# Patient Record
Sex: MALE | Race: BLACK OR AFRICAN AMERICAN | NOT HISPANIC OR LATINO | Employment: OTHER | URBAN - METROPOLITAN AREA
[De-identification: names, ages, dates, MRNs, and addresses within clinical notes are randomized per-mention and may not be internally consistent; named-entity substitution may affect disease eponyms.]

---

## 2017-01-26 ENCOUNTER — TELEPHONE (OUTPATIENT)
Dept: PAIN MEDICINE | Facility: CLINIC | Age: 82
End: 2017-01-26

## 2017-01-26 NOTE — TELEPHONE ENCOUNTER
----- Message from Daya Salvador sent at 1/26/2017  2:26 PM CST -----  Dr. Hudson, advise what procedure needed for pt.    ----- Message -----     From: aMrcia Chavez     Sent: 1/26/2017   8:15 AM       To: [facility] Pain Management Schedulers    x  1st Request  _  2nd Request  _  3rd Request        Who: leonard    Why: pt. Would like to schedule injection.please call pt. To discuss    What Number to Call Back:[phone]    When to Expect a call back: (Before the end of the day)   -- if call after 3:00 call back will be tomorrow.

## 2017-01-27 ENCOUNTER — OFFICE VISIT (OUTPATIENT)
Dept: PAIN MEDICINE | Facility: CLINIC | Age: 82
End: 2017-01-27
Payer: MEDICARE

## 2017-01-27 VITALS
HEIGHT: 70 IN | WEIGHT: 224.88 LBS | HEART RATE: 82 BPM | TEMPERATURE: 98 F | BODY MASS INDEX: 32.19 KG/M2 | SYSTOLIC BLOOD PRESSURE: 168 MMHG | DIASTOLIC BLOOD PRESSURE: 72 MMHG

## 2017-01-27 DIAGNOSIS — M47.816 FACET ARTHRITIS OF LUMBAR REGION: ICD-10-CM

## 2017-01-27 DIAGNOSIS — M51.36 DDD (DEGENERATIVE DISC DISEASE), LUMBAR: ICD-10-CM

## 2017-01-27 DIAGNOSIS — M51.16 LUMBAR DISC HERNIATION WITH RADICULOPATHY: Primary | ICD-10-CM

## 2017-01-27 PROCEDURE — 1159F MED LIST DOCD IN RCRD: CPT | Mod: S$GLB,,, | Performed by: NURSE PRACTITIONER

## 2017-01-27 PROCEDURE — 1160F RVW MEDS BY RX/DR IN RCRD: CPT | Mod: S$GLB,,, | Performed by: NURSE PRACTITIONER

## 2017-01-27 PROCEDURE — 99999 PR PBB SHADOW E&M-EST. PATIENT-LVL III: CPT | Mod: PBBFAC,,, | Performed by: NURSE PRACTITIONER

## 2017-01-27 PROCEDURE — 99213 OFFICE O/P EST LOW 20 MIN: CPT | Mod: S$GLB,,, | Performed by: NURSE PRACTITIONER

## 2017-01-27 PROCEDURE — 1125F AMNT PAIN NOTED PAIN PRSNT: CPT | Mod: S$GLB,,, | Performed by: NURSE PRACTITIONER

## 2017-01-27 PROCEDURE — 1157F ADVNC CARE PLAN IN RCRD: CPT | Mod: S$GLB,,, | Performed by: NURSE PRACTITIONER

## 2017-01-27 NOTE — PROGRESS NOTES
Chronic Pain - Established Patient    Referring Physician: No ref. provider found    Chief Complaint:   Chief Complaint   Patient presents with    Low-back Pain    Back Pain        SUBJECTIVE: Disclaimer: This note has been generated using voice-recognition software. There may be typographical errors that have been missed during proof-reading.    Interval History 1/27/2017:  The patient returns to clinic today for follow up of low back pain. He was last seen in 2016. He reports significant relief for a few months from L4-5 IL LEONIDES on 2/3/2016. He reports that his pain returned about 6 months ago. He reports pressure like pain that begins in his lower back and radiates down the back of both legs to the knee. He does report tingling and numbness to right foot. The pain is exacerbated with walking. He reports relief with leaning over and sitting. He denies any bowel or bladder incontinence or signs of saddle paresthesia. His pain today is 9/10. He reports following with Dr. Rinaldi for left shoulder pain.     Initial encounter:    Jem Pak presents to the clinic for the evaluation of low back pain. The pain started over 20 years ago following nothing in particular and symptoms have been worsening.    Brief history:  When the pain originally began he reports having an injection around 2000 at [facility].  Most recently, he was seeing Dr. Ephraim Mock and had multiple steroid injections, although he is unsure of the type.  He believes his last injection was in 2014 which provided him about 75% pain relief for about one year.    He reports a history of CAD and PAD.  He reports having a heart attack in 1992 which resulted in a stent being placed.   He is currently on Aspirin and Plavix.  He also has a h/o prostate cancer and had a prostatectomy in the past.    Pain Description:    The pain is located in the low back area and radiates to the bilateral lower extremities at knee.      At BEST  6/10     At WORST   10/10 on the WORST day.      On average pain is rated as 5/10.     Today the pain is rated as 5/10    The pain is described as dull and throbbing      Symptoms interfere with daily activity and sleeping.     Exacerbating factors: Laying, Bending, Walking, Lifting and Getting out of bed/chair.      Mitigating factors rest, sitting and injections.     Patient denies night fever/night sweats, urinary incontinence, bowel incontinence, significant weight loss and significant motor weakness.  Patient denies any suicidal or homicidal ideations    Pain Medications:  Current:  etodolac    Tried in Past:  NSAIDs - Etodolac  TCA -Never  SNRI -Never  Anti-convulsants -Never  Muscle Relaxants -Never  Opioids-Never    Physical Therapy/Home Exercise: PT years ago with limited relief.     report:  Not applicable    Pain Procedures: Lumbar injections by Dr. Mock- last in 2014 with relief.  2/3/2016- L4-5 IL LEONIDES    Chiropractor -never  Acupuncture - never  TENS unit -never  Spinal decompression -never  Joint replacement -never    Imaging: MRI lumbar spine 9/2009  Grade I anterolisthesis of L3 on L4.    1.3 x 1 cm right facet synovial cyst at L2/3 causing moderate to severe  spinal canal stenosis    Multilevel degenerative disc disease with moderate central canal narrowing  at L3/4 and L4/5 levels as described above.        Past Medical History   Diagnosis Date    Anticoagulant long-term use     Arthritis     BPH (benign prostatic hyperplasia)     Chronic back pain      with injections Dr. Ephraim Hightower (Pain Management)    Coronary artery disease     Hypertension     PVD (peripheral vascular disease)      Past Surgical History   Procedure Laterality Date    Coronary stent placement      Kidney stone surgery      Prostatectomy  7/2013     TURP    Eye surgery       bilateral cataracts    Vascular surgery       Social History     Social History    Marital status: Legally      Spouse name: N/A    Number of  children: N/A    Years of education: N/A     Occupational History    Not on file.     Social History Main Topics    Smoking status: Former Smoker    Smokeless tobacco: Not on file      Comment: quit 5 yrs ago    Alcohol use No    Drug use: No    Sexual activity: Not on file     Other Topics Concern    Not on file     Social History Narrative     No family history on file.    Review of patient's allergies indicates:   Allergen Reactions    Ciprofloxacin Nausea And Vomiting     Fever, chills, diarrhea    Benadryl [diphenhydramine hcl] Other (See Comments)     Unable to urinate       Current Outpatient Prescriptions   Medication Sig    amlodipine (NORVASC) 5 MG tablet Take 5 mg by mouth once daily.    aspirin (ECOTRIN) 81 MG EC tablet Take 81 mg by mouth once daily.    clopidogrel (PLAVIX) 75 mg tablet     doxazosin (CARDURA XL) 8 mg 24 hr tablet Take 8 mg by mouth daily with breakfast.    etodolac (LODINE) 400 MG tablet     ferrous sulfate 325 mg (65 mg iron) Tab tablet Take 1 tablet (325 mg total) by mouth daily with breakfast.    finasteride (PROSCAR) 5 mg tablet Take 5 mg by mouth once daily.    FLUVIRIN 8027-5138 45 mcg (15 mcg x 3)/0.5 mL Susp     lovastatin (MEVACOR) 40 MG tablet Take 40 mg by mouth every evening.    meloxicam (MOBIC) 15 MG tablet Take 1 tablet (15 mg total) by mouth once daily.    saw palmetto 500 MG capsule Take 500 mg by mouth 2 (two) times daily.    URINARY LEG BAG Kit 1     No current facility-administered medications for this visit.        REVIEW OF SYSTEMS:    GENERAL:  No weight loss, malaise or fevers.  HEENT:   No recent changes in vision or hearing  NECK:  Negative for lumps, no difficulty with swallowing.  RESPIRATORY:  Negative for cough, wheezing or shortness of breath, patient denies any recent URI.  CARDIOVASCULAR:  Negative for chest pain, or palpitations. HTN, CAD.   GI:  Negative for abdominal discomfort, blood in stools or black stools or change in bowel  "habits.  MUSCULOSKELETAL:  See HPI.  SKIN:  Negative for lesions, rash, and itching.  PSYCH:  No mood disorder or recent psychosocial stressors.  Patients sleep is not disturbed secondary to pain.  HEMATOLOGY/LYMPHOLOGY:  Negative for prolonged bleeding or swollen nodes. Patient reports bruising easily secondary to blood thinners. Patient is currently taking Plavix and aspiring.  ENDO: No history of diabetes or thyroid dysfunction  NEURO:   No history of headaches, syncope, paralysis, seizures or tremors.  All other reviewed and negative other than HPI.    OBJECTIVE:    Visit Vitals    BP (!) 168/72    Pulse 82    Temp 97.7 °F (36.5 °C) (Oral)    Ht 5' 10" (1.778 m)    Wt 102 kg (224 lb 13.9 oz)    BMI 32.27 kg/m2       PHYSICAL EXAMINATION:    GENERAL: Well appearing, in no acute distress, alert and oriented x3.  PSYCH:  Mood and affect appropriate.  SKIN: Skin color, texture, turgor normal, no rashes or lesions.  HEAD/FACE:  Normocephalic, atraumatic. Cranial nerves grossly intact.  CV: RRR with palpation of the radial artery.  PULM: No evidence of respiratory difficulty, symmetric chest rise.  GI:  Soft and non-tender.  BACK: Straight leg raising in the supine position is negative to radicular pain. No pain with palpation over the facet joints of the lumbar spine.  Full ROM with pain on flexion and extension.  Positive facet loading bilaterally.  EXTREMITIES: Lower extremity peripheral joint ROM is full and pain free without obvious instability or laxity in all extremities. No deformities, edema, or skin discoloration. Good capillary refill. 4/5 strength in right ankle with plantar and dorsiflexion. 4/5 strength in left ankle with plantar and dorsiflexion. 5/5 strength with right knee flexion and extension. 5/5 strength with left knee flexion and extension. 5/5 strength in right EHL, 5/5 strength in left EHL.    MUSCULOSKELETAL: Shoulder, hip, and knee provocative maneuvers are negative.  There is no pain " with palpation over the sacroiliac joints bilaterally.  FABERs test is negative.  FADIRs test is negative.   Bilateral upper and lower extremity strength is normal and symmetric.  No atrophy or tone abnormalities are noted.  NEURO: Bilateral upper and lower extremity coordination and muscle stretch reflexes are physiologic and symmetric.  Plantar response are downgoing. No clonus.  No loss of sensation is noted.  GAIT: Antalgic- ambulates without assistance.     ASSESSMENT: 86 y.o. year old male with lower back pain, consistent with the following diagnoses:    1. Lumbar disc herniation with radiculopathy     2. Facet arthritis of lumbar region     3. DDD (degenerative disc disease), lumbar         PLAN:     - Previous imaging was reviewed and discussed with the patient today.    - Schedule for L4-5 IL LEONIDES. The procedure, risks, benefits and options were discussed with patient. There are no contraindications to the procedure. The patient expressed understanding and agreed to proceed.  Consent obtained today.    - In the future, if epidural does not relieve his pain, we may consider new imaging. His previous MRI is from 2009.     - We may also consider medial branch blocks as he does have components of lumbar facet arthritis.     - Counseled patient regarding the importance of a continued home exercise routine.    - RTC 2 weeks after above procedure.     The above plan and management options were discussed at length with patient. Patient is in agreement with the above and verbalized understanding. It will be communicated with the referring physician via electronic record, fax, or mail.    Veronica Kenney NP  01/27/2017

## 2017-02-01 ENCOUNTER — TELEPHONE (OUTPATIENT)
Dept: PAIN MEDICINE | Facility: CLINIC | Age: 82
End: 2017-02-01

## 2017-02-01 NOTE — TELEPHONE ENCOUNTER
Contacted patient regarding scheduling his appointment, patient stated he is suppose to be having a procedure, he was seen on 01/27/2016 by NP. He said he was suppose to be waiting on clearance for his plavix, but he said he told someone that if they contact Dr. Mejia office he will provide the clearance.     Please contact patient with update status on procedure and clearance.

## 2017-02-01 NOTE — TELEPHONE ENCOUNTER
Clearance of plavix requested from Dr. Soriano and Sandeep Richey, when received patient will be called for scheduling.

## 2017-02-01 NOTE — TELEPHONE ENCOUNTER
----- Message from Isaak Hudson MD sent at 1/31/2017  8:35 AM CST -----  Can we please set up this patient for a consult.  Patient: TERRIE ESPINOSA   YOB: 1930   Sex: Male     Order id: [id number]   Date: 2017/01/25 at 6:56 PM   From: JOSE DANIELLE III, MD   To: Isaak Hudson   Chief complaint: Spinal stenosis, lumbar region (M48.06)   Services requested: Consult.     Notes:   Pain Management       Documents:     This cover letter     Continuity of Care Document (C-CDA) (Encounter date: 01/25/2017 11:20 AM)     Referral: Pain Medicine. Isaak Hudson.

## 2017-02-01 NOTE — TELEPHONE ENCOUNTER
Plavix clearance requested from both Dr. Soriano and Sandeep Richey. As soon as either clearance is received, patient will be scheduled.

## 2017-02-03 ENCOUNTER — TELEPHONE (OUTPATIENT)
Dept: PAIN MEDICINE | Facility: CLINIC | Age: 82
End: 2017-02-03

## 2017-02-03 NOTE — TELEPHONE ENCOUNTER
----- Message from Torsten Yeboah sent at 2/3/2017  9:31 AM CST -----  _X  1st Request  _  2nd Request  _  3rd Request        Who: Jem Pak    Why: Please call patient concerning plavix medication    What Number to Call Back: [phone]    When to Expect a call back: (Before the end of the day)   -- if the call is after 12:00, the call back will be tomorrow.

## 2017-02-22 ENCOUNTER — HOSPITAL ENCOUNTER (OUTPATIENT)
Facility: OTHER | Age: 82
Discharge: HOME | End: 2017-02-22
Attending: ANESTHESIOLOGY | Admitting: ANESTHESIOLOGY
Payer: MEDICARE

## 2017-02-22 ENCOUNTER — SURGERY (OUTPATIENT)
Age: 82
End: 2017-02-22

## 2017-02-22 VITALS
OXYGEN SATURATION: 100 % | DIASTOLIC BLOOD PRESSURE: 66 MMHG | BODY MASS INDEX: 32.93 KG/M2 | HEIGHT: 70 IN | SYSTOLIC BLOOD PRESSURE: 151 MMHG | TEMPERATURE: 98 F | RESPIRATION RATE: 18 BRPM | HEART RATE: 79 BPM | WEIGHT: 230 LBS

## 2017-02-22 DIAGNOSIS — M51.16 LUMBAR DISC HERNIATION WITH RADICULOPATHY: Primary | ICD-10-CM

## 2017-02-22 PROCEDURE — 62323 NJX INTERLAMINAR LMBR/SAC: CPT | Performed by: ANESTHESIOLOGY

## 2017-02-22 PROCEDURE — 25000003 PHARM REV CODE 250: Performed by: ANESTHESIOLOGY

## 2017-02-22 PROCEDURE — 62323 NJX INTERLAMINAR LMBR/SAC: CPT | Mod: ,,, | Performed by: ANESTHESIOLOGY

## 2017-02-22 PROCEDURE — 25500020 PHARM REV CODE 255: Performed by: ANESTHESIOLOGY

## 2017-02-22 PROCEDURE — 77003 FLUOROGUIDE FOR SPINE INJECT: CPT | Performed by: ANESTHESIOLOGY

## 2017-02-22 PROCEDURE — 62322 NJX INTERLAMINAR LMBR/SAC: CPT | Performed by: ANESTHESIOLOGY

## 2017-02-22 PROCEDURE — 63600175 PHARM REV CODE 636 W HCPCS: Performed by: ANESTHESIOLOGY

## 2017-02-22 RX ORDER — ALPRAZOLAM 0.5 MG/1
0.5 TABLET, ORALLY DISINTEGRATING ORAL NIGHTLY PRN
Status: DISCONTINUED | OUTPATIENT
Start: 2017-02-22 | End: 2017-02-22 | Stop reason: HOSPADM

## 2017-02-22 RX ORDER — SODIUM CHLORIDE 9 MG/ML
3 INJECTION, SOLUTION INTRAMUSCULAR; INTRAVENOUS; SUBCUTANEOUS ONCE
Status: COMPLETED | OUTPATIENT
Start: 2017-02-22 | End: 2017-02-22

## 2017-02-22 RX ORDER — MIDAZOLAM HYDROCHLORIDE 1 MG/ML
2 INJECTION INTRAMUSCULAR; INTRAVENOUS
Status: DISCONTINUED | OUTPATIENT
Start: 2017-02-22 | End: 2017-02-22 | Stop reason: HOSPADM

## 2017-02-22 RX ORDER — BUPIVACAINE HYDROCHLORIDE 2.5 MG/ML
INJECTION, SOLUTION EPIDURAL; INFILTRATION; INTRACAUDAL
Status: DISCONTINUED | OUTPATIENT
Start: 2017-02-22 | End: 2017-02-22 | Stop reason: HOSPADM

## 2017-02-22 RX ORDER — LIDOCAINE HYDROCHLORIDE 10 MG/ML
10 INJECTION INFILTRATION; PERINEURAL
Status: COMPLETED | OUTPATIENT
Start: 2017-02-22 | End: 2017-02-22

## 2017-02-22 RX ORDER — SODIUM CHLORIDE 9 MG/ML
INJECTION, SOLUTION INTRAVENOUS CONTINUOUS
Status: DISCONTINUED | OUTPATIENT
Start: 2017-02-22 | End: 2017-02-22 | Stop reason: HOSPADM

## 2017-02-22 RX ORDER — METHYLPREDNISOLONE ACETATE 40 MG/ML
INJECTION, SUSPENSION INTRA-ARTICULAR; INTRALESIONAL; INTRAMUSCULAR; SOFT TISSUE
Status: DISCONTINUED | OUTPATIENT
Start: 2017-02-22 | End: 2017-02-22 | Stop reason: HOSPADM

## 2017-02-22 RX ORDER — METHYLPREDNISOLONE ACETATE 40 MG/ML
40 INJECTION, SUSPENSION INTRA-ARTICULAR; INTRALESIONAL; INTRAMUSCULAR; SOFT TISSUE ONCE
Status: DISCONTINUED | OUTPATIENT
Start: 2017-02-22 | End: 2017-02-22 | Stop reason: HOSPADM

## 2017-02-22 RX ORDER — BUPIVACAINE HYDROCHLORIDE 2.5 MG/ML
10 INJECTION, SOLUTION EPIDURAL; INFILTRATION; INTRACAUDAL ONCE
Status: DISCONTINUED | OUTPATIENT
Start: 2017-02-22 | End: 2017-02-22 | Stop reason: HOSPADM

## 2017-02-22 RX ADMIN — SODIUM CHLORIDE 3 ML: 9 INJECTION, SOLUTION INTRAMUSCULAR; INTRAVENOUS; SUBCUTANEOUS at 10:02

## 2017-02-22 RX ADMIN — ALPRAZOLAM 0.5 MG: 0.5 TABLET, ORALLY DISINTEGRATING ORAL at 09:02

## 2017-02-22 RX ADMIN — LIDOCAINE HYDROCHLORIDE 10 ML: 10 INJECTION, SOLUTION INFILTRATION; PERINEURAL at 10:02

## 2017-02-22 RX ADMIN — METHYLPREDNISOLONE ACETATE 40 MG: 40 INJECTION, SUSPENSION INTRA-ARTICULAR; INTRALESIONAL; INTRAMUSCULAR; SOFT TISSUE at 10:02

## 2017-02-22 RX ADMIN — IOHEXOL 5 ML: 300 INJECTION, SOLUTION INTRAVENOUS at 10:02

## 2017-02-22 RX ADMIN — BUPIVACAINE HYDROCHLORIDE 10 ML: 2.5 INJECTION, SOLUTION EPIDURAL; INFILTRATION; INTRACAUDAL; PERINEURAL at 10:02

## 2017-02-22 NOTE — DISCHARGE INSTRUCTIONS

## 2017-02-22 NOTE — OP NOTE
Lumbar Interlaminar Epidural Steroid Injection under Fluoroscopic Guidance.   Time-out taken to identify patient and procedure prior to starting the procedure.     02/22/2017    PROCEDURE: Interlaminar epidural steroid injection under fluoroscopic guidance.     Pre-Op diagnosis: lumbar stenosis with radiculopathy    Post-Op diagnosis: same    PHYSICIAN: EDWINA MATTHEW     ASSISTANTS: None     ESTIMATED BLOOD LOSS: none.     COMPLICATIONS: none.     SPECIMENS: none    TECHNIQUE: With the patient laying in a prone position, the area was prepped and draped in the usual sterile fashion using ChloraPrep and a fenestrated drape. 1% lidocaine was given using a 27-gauge needle by raising a wheal and going down to the hub of the needle over the L4/5 interlaminar space.  The interlaminar space was then approached with a 3.5 inch 18-gauge Touhy needle was introduced under fluoroscopic guidance in the AP and Lateral view. Once the Ligamentum flavum was encountered loss of resistance to saline was used to enter the epidural space. With positive loss of resistance and negative CSF or Blood, 3mL contrast dye Omnipaque (300mg/ml) was injected to confirm placement and there was no vascular runoff. Then 1ml 40mg/ml Depomedrol + 1mL 0.25% Bupivicaine + 8mL preservative free normal saline was injected slowly. Displacement of the radio opaque contrast after injection of the medication confirmed that the medication went into the area of the epidural space.  The patient tolerated the procedure well.       The patient was monitored after the procedure.   They were given post-procedure and discharge instructions to follow at home.  The patient was discharged in a stable condition.

## 2017-02-22 NOTE — IP AVS SNAPSHOT
[facility] Location ([facility])  [address] [place], [place] [address]  Phone: [phone]           Patient Discharge Instructions     Our goal is to set you up for success. This packet includes information on your condition, medications, and your home care. It will help you to care for yourself so you don't get sicker and need to go back to the hospital.     Please ask your nurse if you have any questions.        There are many details to remember when preparing to leave the hospital. Here is what you will need to do:    1. Take your medicine. If you are prescribed medications, review your Medication List in the following pages. You may have new medications to  at the pharmacy and others that you'll need to stop taking. Review the instructions for how and when to take your medications. Talk with your doctor or nurses if you are unsure of what to do.     2. Go to your follow-up appointments. Specific follow-up information is listed in the following pages. Your may be contacted by a transition nurse or clinical provider about future appointments. Be sure we have all of the phone numbers to reach you, if needed. Please contact your provider's office if you are unable to make an appointment.     3. Watch for warning signs. Your doctor or nurse will give you detailed warning signs to watch for and when to call for assistance. These instructions may also include educational information about your condition. If you experience any of warning signs to your health, call your doctor.               [facility]  Unless otherwise directed by your provider, please contact [facility], our nurse care line that is available for 24/7 assistance.     [phone] (toll-free)    Registered nurses in the [facility] provide clinical advisement, health education, appointment booking, and other advisory services.                    ** Verify the list of medication(s) below is accurate and up to  date. Carry this with you in case of emergency. If your medications have changed, please notify your healthcare provider.             Medication List      CONTINUE taking these medications        Additional Info                      amlodipine 5 MG tablet   Commonly known as:  NORVASC   Refills:  0   Dose:  5 mg    Instructions:  Take 5 mg by mouth once daily.     Begin Date    AM    Noon    PM    Bedtime       aspirin 81 MG EC tablet   Commonly known as:  ECOTRIN   Refills:  0   Dose:  81 mg    Instructions:  Take 81 mg by mouth once daily.     Begin Date    AM    Noon    PM    Bedtime       clopidogrel 75 mg tablet   Commonly known as:  PLAVIX   Refills:  3      Begin Date    AM    Noon    PM    Bedtime       doxazosin 8 mg 24 hr tablet   Commonly known as:  CARDURA XL   Refills:  0   Dose:  8 mg    Instructions:  Take 8 mg by mouth daily with breakfast.     Begin Date    AM    Noon    PM    Bedtime       finasteride 5 mg tablet   Commonly known as:  PROSCAR   Refills:  0   Dose:  5 mg    Instructions:  Take 5 mg by mouth once daily.     Begin Date    AM    Noon    PM    Bedtime       FLUVIRIN 2863-9285 45 mcg (15 mcg x 3)/0.5 mL Susp   Refills:  0   Generic drug:  flu vaccine ts 2013-14 (4 yr+)      Begin Date    AM    Noon    PM    Bedtime       lovastatin 40 MG tablet   Commonly known as:  MEVACOR   Refills:  0   Dose:  40 mg    Instructions:  Take 40 mg by mouth every evening.     Begin Date    AM    Noon    PM    Bedtime       meloxicam 15 MG tablet   Commonly known as:  MOBIC   Quantity:  30 tablet   Refills:  3   Dose:  15 mg    Instructions:  Take 1 tablet (15 mg total) by mouth once daily.     Begin Date    AM    Noon    PM    Bedtime       saw palmetto 500 MG capsule   Refills:  0   Dose:  500 mg    Instructions:  Take 500 mg by mouth 2 (two) times daily.     Begin Date    AM    Noon    PM    Bedtime       URINARY LEG BAG Kit   Quantity:  1 kit   Refills:  o   Generic drug:  urinary bag    Instructions:   1     Begin Date    AM    Noon    PM    Bedtime         ASK your doctor about these medications        Additional Info                      etodolac 400 MG tablet   Commonly known as:  LODINE   Refills:  0      Begin Date    AM    Noon    PM    Bedtime       ferrous sulfate 325 mg (65 mg iron) Tab tablet   Quantity:  60 tablet   Refills:  6   Dose:  325 mg    Instructions:  Take 1 tablet (325 mg total) by mouth daily with breakfast.     Begin Date    AM    Noon    PM    Bedtime                  Please bring to all follow up appointments:    1. A copy of your discharge instructions.  2. All medicines you are currently taking in their original bottles.  3. Identification and insurance card.    Please arrive 15 minutes ahead of scheduled appointment time.    Please call 24 hours in advance if you must reschedule your appointment and/or time.        Your Scheduled Appointments     Mar 09, 2017  2:00 PM CST   Established Patient Visit with Isaak Hudson MD   [facility] ([facility])    [address] [address]   [phone]                  Discharge Instructions       Home Care Instructions Pain Management:    1. DIET:   You may resume your normal diet today.   2. BATHING:   You may shower with luke warm water. No soaking in tub.  3. DRESSING:   You may remove your bandage today.   4. ACTIVITY LEVEL:   You may resume your normal activities 24 hrs after your procedure.  5. MEDICATIONS:   You may resume your normal medications today.   6. SPECIAL INSTRUCTIONS:   No heat to the injection site for 24 hrs including, bath or shower, heating pad, moist heat, or hot tubs.    Use ice pack to injection site for any pain or discomfort.  Apply ice packs for 20 minute intervals as needed.   If you have received any sedatives by mouth today you may not drive for 12 hours.    If you have received any sedation through your IV, you may not drive for 24 hrs.     PLEASE CALL YOUR DOCTOR  "IF:  1. Redness or swelling around the injection site.  2. Fever of 101 degrees  3. Drainage (pus) from the injection site.  4. For any continuous bleeding (some dried blood over the incision is normal.)  5. For severe headache that is relieved when lying flat.    FOR EMERGENCIES:   If any unusual problems or difficulties occur during clinic hours, call [phone] or 293.         Admission Information     Date & Time Provider Department [facility]    2/22/2017  9:14 AM Isaak Hudson MD [facility] [id number]      Care Providers     Provider Role Specialty Primary office phone    Isaak Hudson MD Attending Provider Pain Medicine [phone]    Isaak Hudson MD Surgeon  Pain Medicine [phone]      Your Vitals Were     BP Pulse Temp Resp Height Weight    151/66 (BP Location: Right arm, Patient Position: Lying, BP Method: Automatic) 79 98.1 °F (36.7 °C) (Oral) 18 5' 10" (1.778 m) 104.3 kg (230 lb)    SpO2 BMI             100% 33 kg/m2         Recent Lab Values     No lab values to display.      Allergies as of 2/22/2017        Reactions    Ciprofloxacin Nausea And Vomiting    Fever, chills, diarrhea    Benadryl [Diphenhydramine Hcl] Other (See Comments)    Unable to urinate      Advance Directives     An advance directive is a document which, in the event you are no longer able to make decisions for yourself, tells your healthcare team what kind of treatment you do or do not want to receive, or who you would like to make those decisions for you.  If you do not currently have an advance directive, [facility] encourages you to create one.  For more information call:  [phone]), [phone] ([phone]),  or log on to [email or web address].        Smoking Cessation     If you would like to quit smoking:   You may be eligible for free services if you are a [place] resident and started smoking cigarettes before September 1, 1988.  Call the Smoking Cessation Trust (SCT) " toll free at (217) 246-2803 or (904) 610-4997.   Call 1-800-QUIT-NOW if you do not meet the above criteria.            Language Assistance Services     ATTENTION: Language assistance services are available, free of charge. Please call [phone].      ATENCIÓN: Si habla jona, tiene a mardid disposición servicios gratuitos de asistencia lingüística. Llame al [phone].     CHÚ Ý: N?u b?n nói Ti?ng Vi?t, có các d?ch v? h? tr? ngôn ng? mi?n phí dành cho b?n. G?i s? [phone].         [facility] complies with applicable Federal civil rights laws and does not discriminate on the basis of race, color, national origin, age, disability, or sex.

## 2017-02-22 NOTE — DISCHARGE SUMMARY
Discharge Note  Short Stay      SUMMARY     Admit Date: 2/22/2017    Attending Physician: Isaak Hudson      Discharge Physician: Isaak Hudson      Discharge Date: 2/22/2017 10:28 AM     PROCEDURE: Interlaminar epidural steroid injection under fluoroscopic guidance.     Pre-Op diagnosis: lumbar stenosis with radiculopathy    Disposition: Home or self care    Patient Instructions:   Current Discharge Medication List      CONTINUE these medications which have NOT CHANGED    Details   amlodipine (NORVASC) 5 MG tablet Take 5 mg by mouth once daily.      aspirin (ECOTRIN) 81 MG EC tablet Take 81 mg by mouth once daily.      doxazosin (CARDURA XL) 8 mg 24 hr tablet Take 8 mg by mouth daily with breakfast.      finasteride (PROSCAR) 5 mg tablet Take 5 mg by mouth once daily.      lovastatin (MEVACOR) 40 MG tablet Take 40 mg by mouth every evening.      clopidogrel (PLAVIX) 75 mg tablet Refills: 3      FLUVIRIN [id number] 45 mcg (15 mcg x 3)/0.5 mL Susp       meloxicam (MOBIC) 15 MG tablet Take 1 tablet (15 mg total) by mouth once daily.  Qty: 30 tablet, Refills: 3      saw palmetto 500 MG capsule Take 500 mg by mouth 2 (two) times daily.      URINARY LEG BAG Kit 1  Qty: 1 kit, Refills: o         STOP taking these medications       etodolac (LODINE) 400 MG tablet Comments:   Reason for Stopping:         ferrous sulfate 325 mg (65 mg iron) Tab tablet Comments:   Reason for Stopping:               Resume home diet and activity

## 2017-03-09 ENCOUNTER — OFFICE VISIT (OUTPATIENT)
Dept: SPINE | Facility: CLINIC | Age: 82
End: 2017-03-09
Attending: ANESTHESIOLOGY
Payer: MEDICARE

## 2017-03-09 VITALS
HEART RATE: 109 BPM | DIASTOLIC BLOOD PRESSURE: 79 MMHG | WEIGHT: 224 LBS | HEIGHT: 70 IN | BODY MASS INDEX: 32.07 KG/M2 | SYSTOLIC BLOOD PRESSURE: 157 MMHG

## 2017-03-09 DIAGNOSIS — M51.36 DDD (DEGENERATIVE DISC DISEASE), LUMBAR: ICD-10-CM

## 2017-03-09 DIAGNOSIS — I73.9 PAD (PERIPHERAL ARTERY DISEASE): ICD-10-CM

## 2017-03-09 DIAGNOSIS — M47.816 FACET ARTHRITIS OF LUMBAR REGION: Primary | ICD-10-CM

## 2017-03-09 DIAGNOSIS — M51.16 LUMBAR DISC HERNIATION WITH RADICULOPATHY: ICD-10-CM

## 2017-03-09 PROCEDURE — 1160F RVW MEDS BY RX/DR IN RCRD: CPT | Mod: S$GLB,,, | Performed by: ANESTHESIOLOGY

## 2017-03-09 PROCEDURE — 1125F AMNT PAIN NOTED PAIN PRSNT: CPT | Mod: S$GLB,,, | Performed by: ANESTHESIOLOGY

## 2017-03-09 PROCEDURE — 99999 PR PBB SHADOW E&M-EST. PATIENT-LVL III: CPT | Mod: PBBFAC,,, | Performed by: ANESTHESIOLOGY

## 2017-03-09 PROCEDURE — 99213 OFFICE O/P EST LOW 20 MIN: CPT | Mod: S$GLB,,, | Performed by: ANESTHESIOLOGY

## 2017-03-09 PROCEDURE — 1157F ADVNC CARE PLAN IN RCRD: CPT | Mod: S$GLB,,, | Performed by: ANESTHESIOLOGY

## 2017-03-09 PROCEDURE — 1159F MED LIST DOCD IN RCRD: CPT | Mod: S$GLB,,, | Performed by: ANESTHESIOLOGY

## 2017-03-09 NOTE — PROGRESS NOTES
Chronic Pain - New Consult    Referring Physician: Referral, Self    Chief Complaint:   Chief Complaint   Patient presents with    Low-back Pain        SUBJECTIVE: Disclaimer: This note has been generated using voice-recognition software. There may be typographical errors that have been missed during proof-reading.    Interval History 3/9/2017:  Patient arrives to clinic for a 2 week follow up after getting a Lumbar LEONIDES L4-5. Patient stated that he only got 50% relief after procedure. Patient reports his pain as a 5/10 today. No other health changes.    Initial encounter:    Jem Pak presents to the clinic for the evaluation of low back pain. The pain started over 20 years ago following nothing in particular and symptoms have been worsening.    Brief history:  When the pain originally began he reports having an injection around 2000 at [facility].  Most recently, he was seeing Dr. Ephraim Mock and had multiple steroid injections, although he is unsure of the type.  He believes his last injection was in 2014 which provided him about 75% pain relief for about one year.    He reports a history of CAD and PAD.  He reports having a heart attack in 1992 which resulted in a stent being placed.   He is currently on Aspirin and Plavix.  He also has a h/o prostate cancer and had a prostatectomy in the past.    Pain Description:    The pain is located in the low back area and radiates to the bilateral lower extremities at knee.      At BEST  6/10     At WORST  10/10 on the WORST day.      On average pain is rated as 5/10.     Today the pain is rated as 5/10    The pain is described as dull and throbbing      Symptoms interfere with daily activity and sleeping.     Exacerbating factors: Laying, Bending, Walking, Lifting and Getting out of bed/chair.      Mitigating factors rest, sitting and injections.     Patient denies night fever/night sweats, urinary incontinence, bowel incontinence, significant weight loss and  significant motor weakness.  Patient denies any suicidal or homicidal ideations    Pain Medications:  Current:  etodolac    Tried in Past:  NSAIDs - Etodolac  TCA -Never  SNRI -Never  Anti-convulsants -Never  Muscle Relaxants -Never  Opioids-Never    Physical Therapy/Home Exercise: PT years ago with limited relief.     report:  Not applicable    Pain Procedures: Lumbar injections by Dr. Mock- last in 2014 with relief, records not available on today's visit.    Chiropractor -never  Acupuncture - never  TENS unit -never  Spinal decompression -never  Joint replacement -never    Imaging: MRI lumbar spine 9/2009  Grade I anterolisthesis of L3 on L4.    1.3 x 1 cm right facet synovial cyst at L2/3 causing moderate to severe  spinal canal stenosis    Multilevel degenerative disc disease with moderate central canal narrowing  at L3/4 and L4/5 levels as described above.        Past Medical History:   Diagnosis Date    Anticoagulant long-term use     Arthritis     BPH (benign prostatic hyperplasia)     Chronic back pain     with injections Dr. Ephraim Hightower (Pain Management)    Coronary artery disease     Hypertension     PVD (peripheral vascular disease)      Past Surgical History:   Procedure Laterality Date    CORONARY STENT PLACEMENT      EYE SURGERY      bilateral cataracts    KIDNEY STONE SURGERY      PROSTATECTOMY  7/2013    TURP    VASCULAR SURGERY       Social History     Social History    Marital status: Legally      Spouse name: N/A    Number of children: N/A    Years of education: N/A     Occupational History    Not on file.     Social History Main Topics    Smoking status: Former Smoker    Smokeless tobacco: Not on file      Comment: quit 5 yrs ago    Alcohol use No    Drug use: No    Sexual activity: Not on file     Other Topics Concern    Not on file     Social History Narrative     No family history on file.    Review of patient's allergies indicates:   Allergen Reactions     Ciprofloxacin Nausea And Vomiting     Fever, chills, diarrhea    Benadryl [diphenhydramine hcl] Other (See Comments)     Unable to urinate       Current Outpatient Prescriptions   Medication Sig    amlodipine (NORVASC) 5 MG tablet Take 5 mg by mouth once daily.    aspirin (ECOTRIN) 81 MG EC tablet Take 81 mg by mouth once daily.    clopidogrel (PLAVIX) 75 mg tablet     doxazosin (CARDURA XL) 8 mg 24 hr tablet Take 8 mg by mouth daily with breakfast.    finasteride (PROSCAR) 5 mg tablet Take 5 mg by mouth once daily.    FLUVIRIN 9924-9199 45 mcg (15 mcg x 3)/0.5 mL Susp     lovastatin (MEVACOR) 40 MG tablet Take 40 mg by mouth every evening.    meloxicam (MOBIC) 15 MG tablet Take 1 tablet (15 mg total) by mouth once daily.    predniSONE (DELTASONE) 5 MG tablet Take 5 mg by mouth once daily.    saw palmetto 500 MG capsule Take 500 mg by mouth 2 (two) times daily.    URINARY LEG BAG Kit 1     No current facility-administered medications for this visit.        REVIEW OF SYSTEMS:    GENERAL:  No weight loss, malaise or fevers.  HEENT:   No recent changes in vision or hearing  NECK:  Negative for lumps, no difficulty with swallowing.  RESPIRATORY:  Negative for cough, wheezing or shortness of breath, patient denies any recent URI.  CARDIOVASCULAR:  Negative for chest pain, or palpitations. Leg swelling secondary to heart disease.  GI:  Negative for abdominal discomfort, blood in stools or black stools or change in bowel habits.  MUSCULOSKELETAL:  See HPI.  SKIN:  Negative for lesions, rash, and itching.  PSYCH:  No mood disorder or recent psychosocial stressors.  Patients sleep is not disturbed secondary to pain.  HEMATOLOGY/LYMPHOLOGY:  Negative for prolonged bleeding or swollen nodes. Patient reports bruising easily secondary to blood thinners. Patient is currently taking Plavix and aspiring.  ENDO: No history of diabetes or thyroid dysfunction  NEURO:   No history of headaches, syncope, paralysis,  "seizures or tremors.  All other reviewed and negative other than HPI.    OBJECTIVE:    BP (!) 157/79  Pulse 109  Ht 5' 10" (1.778 m)  Wt 101.6 kg (224 lb)  BMI 32.14 kg/m2    PHYSICAL EXAMINATION:    GENERAL: Well appearing, in no acute distress, alert and oriented x3.  PSYCH:  Mood and affect appropriate.  SKIN: Skin color, texture, turgor normal, no rashes or lesions.  HEAD/FACE:  Normocephalic, atraumatic. Cranial nerves grossly intact.  CV: RRR with palpation of the radial artery.  PULM: No evidence of respiratory difficulty, symmetric chest rise.  GI:  Soft and non-tender.  BACK: Straight leg raising in the supine position is negative to radicular pain. Pain with palpation over the facet joints of the lumbar spine.  Pain with lumbar extension.  Positive facet loading bilaterally.  EXTREMITIES: Lower extremity peripheral joint ROM is full and pain free without obvious instability or laxity in all extremities. Pain with palpation to posterior area of left shoulder.  Negative NEER and Huff. No deformities, edema, or skin discoloration. Good capillary refill.  Lower extremity strength equal and symmetric 5/5 with knee extension/flex.  Dorsiflexion of ankle 4/5 bilaterally.  MUSCULOSKELETAL: Shoulder, hip, and knee provocative maneuvers are negative.  There is no pain with palpation over the sacroiliac joints bilaterally.  FABERs test is negative.  FADIRs test is negative.   Bilateral upper and lower extremity strength is normal and symmetric.  No atrophy or tone abnormalities are noted.  NEURO: Bilateral upper and lower extremity coordination and muscle stretch reflexes are physiologic and symmetric.  Plantar response are downgoing. No clonus.  No loss of sensation is noted.  GAIT: normal.    ASSESSMENT: 86 y.o. year old male with lower back pain, consistent with lumbar radiculopathy, lumbar DDD and lumbar facet arthropathy.    No diagnosis found.    PLAN:   -We will request records from Dr. Ephraim Mock " do find out what injections he had in the past since these provided excellent relief.  -Order Lumbar XRAY and Lumbar MRI.  After reviewing the results and previous records, I will determine which injection to schedule.  Most likely, this will be a lumbar LEONIDES.  We will obtain clearance from Dr. Soriano to hold Plavix.  - Schedule for a Lumbar Epidural Steroid Injection at L4-5 to help withpain and progress with a Home exercise program.  - In the future I will consider Radiofrequency rhizotomy for longer pain relief if the above is less then 3 months.  RFA has shown to be helpful for up to 6 to 18 months.  - Counseled patient regarding the importance of a continued home exercise routine.    The above plan and management options were discussed at length with patient. Patient is in agreement with the above and verbalized understanding. It will be communicated with the referring physician via electronic record, fax, or mail.    Ana Mathis  03/09/2017

## 2017-03-09 NOTE — PROGRESS NOTES
Chronic Pain - follow Up    Referring Physician: Referral, Self    Chief Complaint:   Chief Complaint   Patient presents with    Low-back Pain        SUBJECTIVE: Disclaimer: This note has been generated using voice-recognition software. There may be typographical errors that have been missed during proof-reading.    Interval history 3/9/2017:  Patient is status post lumbar interlaminar epidural steroid injection at L4-5 on 2/22/2017 reports overall 50% relief of his pain he does continue to have peripheral arterial disease which is causing symptoms of vascular claudication.  No other health changes since previous encounter patient has resumed his Plavix.    Initial encounter:    Jem Pak presents to the clinic for the evaluation of low back pain. The pain started over 20 years ago following nothing in particular and symptoms have been worsening.    Brief history:  When the pain originally began he reports having an injection around 2000 at [facility].  Most recently, he was seeing Dr. Ephraim Mock and had multiple steroid injections, although he is unsure of the type.  He believes his last injection was in 2014 which provided him about 75% pain relief for about one year.    He reports a history of CAD and PAD.  He reports having a heart attack in 1992 which resulted in a stent being placed.   He is currently on Aspirin and Plavix.  He also has a h/o prostate cancer and had a prostatectomy in the past.    Pain Description:    The pain is located in the low back area and radiates to the bilateral lower extremities at knee.      At BEST  6/10     At WORST  10/10 on the WORST day.      On average pain is rated as 5/10.     Today the pain is rated as 5/10    The pain is described as dull and throbbing      Symptoms interfere with daily activity and sleeping.     Exacerbating factors: Laying, Bending, Walking, Lifting and Getting out of bed/chair.      Mitigating factors rest, sitting and injections.     Patient  denies night fever/night sweats, urinary incontinence, bowel incontinence, significant weight loss and significant motor weakness.  Patient denies any suicidal or homicidal ideations    Pain Medications:  Current:  etodolac    Tried in Past:  NSAIDs - Etodolac  TCA -Never  SNRI -Never  Anti-convulsants -Never  Muscle Relaxants -Never  Opioids-Never    Physical Therapy/Home Exercise: PT years ago with limited relief.     report:  Not applicable    Pain Procedures: Lumbar injections by Dr. Mock- last in 2014 with relief, records not available on today's visit.  Lumbar interlaminar epidural steroid injection at L4-5 2/22/2017    Chiropractor -never  Acupuncture - never  TENS unit -never  Spinal decompression -never  Joint replacement -never    Imaging: MRI lumbar spine 9/2009  Grade I anterolisthesis of L3 on L4.    1.3 x 1 cm right facet synovial cyst at L2/3 causing moderate to severe  spinal canal stenosis    Multilevel degenerative disc disease with moderate central canal narrowing  at L3/4 and L4/5 levels as described above.        Past Medical History:   Diagnosis Date    Anticoagulant long-term use     Arthritis     BPH (benign prostatic hyperplasia)     Chronic back pain     with injections Dr. Ephraim Hightower (Pain Management)    Coronary artery disease     Hypertension     PVD (peripheral vascular disease)      Past Surgical History:   Procedure Laterality Date    CORONARY STENT PLACEMENT      EYE SURGERY      bilateral cataracts    KIDNEY STONE SURGERY      PROSTATECTOMY  7/2013    TURP    VASCULAR SURGERY       Social History     Social History    Marital status: Legally      Spouse name: N/A    Number of children: N/A    Years of education: N/A     Occupational History    Not on file.     Social History Main Topics    Smoking status: Former Smoker    Smokeless tobacco: Not on file      Comment: quit 5 yrs ago    Alcohol use No    Drug use: No    Sexual activity: Not on  file     Other Topics Concern    Not on file     Social History Narrative     No family history on file.    Review of patient's allergies indicates:   Allergen Reactions    Ciprofloxacin Nausea And Vomiting     Fever, chills, diarrhea    Benadryl [diphenhydramine hcl] Other (See Comments)     Unable to urinate       Current Outpatient Prescriptions   Medication Sig    amlodipine (NORVASC) 5 MG tablet Take 5 mg by mouth once daily.    aspirin (ECOTRIN) 81 MG EC tablet Take 81 mg by mouth once daily.    clopidogrel (PLAVIX) 75 mg tablet     doxazosin (CARDURA XL) 8 mg 24 hr tablet Take 8 mg by mouth daily with breakfast.    finasteride (PROSCAR) 5 mg tablet Take 5 mg by mouth once daily.    FLUVIRIN 3568-1731 45 mcg (15 mcg x 3)/0.5 mL Susp     lovastatin (MEVACOR) 40 MG tablet Take 40 mg by mouth every evening.    meloxicam (MOBIC) 15 MG tablet Take 1 tablet (15 mg total) by mouth once daily.    predniSONE (DELTASONE) 5 MG tablet Take 5 mg by mouth once daily.    saw palmetto 500 MG capsule Take 500 mg by mouth 2 (two) times daily.    URINARY LEG BAG Kit 1     No current facility-administered medications for this visit.        REVIEW OF SYSTEMS:    GENERAL:  No weight loss, malaise or fevers.  RESPIRATORY:  Negative for cough, wheezing or shortness of breath, patient denies any recent URI.  CARDIOVASCULAR:  Negative for chest pain, or palpitations. Leg swelling secondary to heart disease.  GI:  Negative for abdominal discomfort, blood in stools or black stools or change in bowel habits.  MUSCULOSKELETAL:  See HPI.  SKIN:  Negative for lesions, rash, and itching.  PSYCH:  No mood disorder or recent psychosocial stressors.  Patients sleep is not disturbed secondary to pain.  HEMATOLOGY/LYMPHOLOGY:  Negative for prolonged bleeding or swollen nodes. Patient reports bruising easily secondary to blood thinners. Patient is currently taking Plavix and aspiring.  ENDO: No history of diabetes or thyroid  "dysfunction  NEURO:   No history of headaches, syncope, paralysis, seizures or tremors.  All other reviewed and negative other than HPI.    OBJECTIVE:    BP (!) 157/79  Pulse 109  Ht 5' 10" (1.778 m)  Wt 101.6 kg (224 lb)  BMI 32.14 kg/m2    PHYSICAL EXAMINATION:    GENERAL: Well appearing, in no acute distress, alert and oriented x3.  PSYCH:  Mood and affect appropriate.  SKIN:  No evidence of infection from previous injection site.  HEAD/FACE:  Normocephalic, atraumatic.   CV: RRR with palpation of the radial artery.  PULM: No evidence of respiratory difficulty, symmetric chest rise.  BACK: Straight leg raising in the supine position is negative to radicular pain. Pain with palpation over the facet joints of the lumbar spine.  Pain with lumbar extension.  Positive facet loading bilaterally.  EXTREMITIES: Lower extremity peripheral joint ROM is full and pain free without obvious instability or laxity in all extremities. Pain with palpation to posterior area of left shoulder.  Negative NEER and Huff. No deformities, edema, or skin discoloration. Good capillary refill.  Lower extremity strength equal and symmetric 5/5 with knee extension/flex.  Dorsiflexion of ankle 4/5 bilaterally.  MUSCULOSKELETAL: Shoulder, hip, and knee provocative maneuvers are negative.  There is no pain with palpation over the sacroiliac joints bilaterally.  FABERs test is negative.  FADIRs test is negative.   Bilateral upper and lower extremity strength is normal and symmetric.  No atrophy or tone abnormalities are noted.  NEURO: Cranial nerves grossly intact.  GAIT: Antalgic, ambulates without assistance     ASSESSMENT: 86 y.o. year old male with lower back pain, consistent with lumbar radiculopathy, lumbar DDD and lumbar facet arthropathy.    1. Facet arthritis of lumbar region     2. DDD (degenerative disc disease), lumbar     3. Lumbar disc herniation with radiculopathy     4. PAD (peripheral artery disease)         PLAN: "   -Patient will need to hold Plavix for 7 days prior to repeat the injection  - Schedule for a Lumbar Epidural Steroid Injection at L4-5 to help with pain and progress with a Home exercise program.  - In the future I will consider Radiofrequency rhizotomy for longer pain relief if the above is less then 3 months.  RFA has shown to be helpful for up to 6 to 18 months.  - Counseled patient regarding the importance of a continued home exercise routine.    In the future the patient may benefit from spinal cord stimulator as this may help with symptoms of neurogenic claudication and also with peripheral arterial disease.    The above plan and management options were discussed at length with patient. Patient is in agreement with the above and verbalized understanding. It will be communicated with the referring physician via electronic record, fax, or mail.    Isaak Hudson  03/09/2017

## 2017-03-09 NOTE — MR AVS SNAPSHOT
[facility]  [address]  [address]  [facility] [address]  Phone: [phone]  Fax: [phone]                  Jem Pak   3/9/2017 2:00 PM   Office Visit    Description:  Male : 1930   Provider:  Isaak Hudson MD   Department:  [facility]           Reason for Visit     Low-back Pain                To Do List           Goals (5 Years of Data)     None      [facility] On Call     [facility] On [facility] Nurse [facility] Line -  Assistance  Registered nurses in the [facility] provide clinical advisement, health education, appointment booking, and other advisory services.  Call for this free service at [phone].             Medications           Message regarding Medications     Verify the changes and/or additions to your medication regime listed below are the same as discussed with your clinician today.  If any of these changes or additions are incorrect, please notify your healthcare provider.             Verify that the below list of medications is an accurate representation of the medications you are currently taking.  If none reported, the list may be blank. If incorrect, please contact your healthcare provider. Carry this list with you in case of emergency.           Current Medications     amlodipine (NORVASC) 5 MG tablet Take 5 mg by mouth once daily.    aspirin (ECOTRIN) 81 MG EC tablet Take 81 mg by mouth once daily.    clopidogrel (PLAVIX) 75 mg tablet     doxazosin (CARDURA XL) 8 mg 24 hr tablet Take 8 mg by mouth daily with breakfast.    finasteride (PROSCAR) 5 mg tablet Take 5 mg by mouth once daily.    FLUVIRIN [id number] 45 mcg (15 mcg x 3)/0.5 mL Susp     lovastatin (MEVACOR) 40 MG tablet Take 40 mg by mouth every evening.    meloxicam (MOBIC) 15 MG tablet Take 1 tablet (15 mg total) by mouth once daily.    predniSONE (DELTASONE) 5 MG tablet Take 5 mg by mouth once daily.    saw palmetto 500 MG capsule Take 500 mg by mouth 2 (two)  "times daily.    URINARY LEG BAG Kit 1           Clinical Reference Information           Your Vitals Were     BP Pulse Height Weight BMI    157/79 109 5' 10" (1.778 m) 101.6 kg (224 lb) 32.14 kg/m2      Blood Pressure          Most Recent Value    BP  (!)  157/79      Allergies as of 3/9/2017     Ciprofloxacin    Benadryl [Diphenhydramine Hcl]      Immunizations Administered on Date of Encounter - 3/9/2017     None      Language Assistance Services     ATTENTION: Language assistance services are available, free of charge. Please call [phone].      ATENCIÓN: Si habla jona, tiene a madrid disposición servicios gratuitos de asistencia lingüística. Llame al [phone].     CHÚ Ý: N?u b?n nói Ti?ng Vi?t, có các d?ch v? h? tr? ngôn ng? mi?n phí dành cho b?n. G?i s? [phone].         [facility] complies with applicable Federal civil rights laws and does not discriminate on the basis of race, color, national origin, age, disability, or sex.        "

## 2017-03-09 NOTE — PROGRESS NOTES
Chronic Pain - Established Patient     Referring Physician: Referral, Self    Chief Complaint:   Chief Complaint   Patient presents with    Low-back Pain   SUBJECTIVE: Disclaimer: This note has been generated using voice-recognition software. There may be typographical errors that have been missed during proof-reading.     Interval History 1/27/2017:  The patient returns to clinic today for follow up of low back pain. He was last seen in 2016. He reports significant relief for a few months from L4-5 IL LEONIDES on 2/3/2016. He reports that his pain returned about 6 months ago. He reports pressure like pain that begins in his lower back and radiates down the back of both legs to the knee. He does report tingling and numbness to right foot. The pain is exacerbated with walking. He reports relief with leaning over and sitting. He denies any bowel or bladder incontinence or signs of saddle paresthesia. His pain today is 9/10. He reports following with Dr. Rinaldi for left shoulder pain.      Initial encounter:     Jem Pak presents to the clinic for the evaluation of low back pain. The pain started over 20 years ago following nothing in particular and symptoms have been worsening.     Brief history:  When the pain originally began he reports having an injection around 2000 at [facility].  Most recently, he was seeing Dr. Ephraim Mock and had multiple steroid injections, although he is unsure of the type.  He believes his last injection was in 2014 which provided him about 75% pain relief for about one year.    He reports a history of CAD and PAD.  He reports having a heart attack in 1992 which resulted in a stent being placed.   He is currently on Aspirin and Plavix.  He also has a h/o prostate cancer and had a prostatectomy in the past.     Pain Description:     The pain is located in the low back area and radiates to the bilateral lower extremities at knee.       At BEST  6/10      At WORST  10/10 on the WORST  day.       On average pain is rated as 5/10.      Today the pain is rated as 5/10     The pain is described as dull and throbbing       Symptoms interfere with daily activity and sleeping.      Exacerbating factors: Laying, Bending, Walking, Lifting and Getting out of bed/chair.       Mitigating factors rest, sitting and injections.      Patient denies night fever/night sweats, urinary incontinence, bowel incontinence, significant weight loss and significant motor weakness.  Patient denies any suicidal or homicidal ideations     Pain Medications:  Current:  etodolac     Tried in Past:  NSAIDs - Etodolac  TCA -Never  SNRI -Never  Anti-convulsants -Never  Muscle Relaxants -Never  Opioids-Never     Physical Therapy/Home Exercise: PT years ago with limited relief.      report:  Not applicable     Pain Procedures: Lumbar injections by Dr. Mock- last in 2014 with relief.  2/3/2016- L4-5 IL LEONIDES     Chiropractor -never  Acupuncture - never  TENS unit -never  Spinal decompression -never  Joint replacement -never     Imaging: MRI lumbar spine 9/2009  Grade I anterolisthesis of L3 on L4.    1.3 x 1 cm right facet synovial cyst at L2/3 causing moderate to severe  spinal canal stenosis    Multilevel degenerative disc disease with moderate central canal narrowing  at L3/4 and L4/5 levels as described above.          Imaging: none available for review today    Past Medical History:   Diagnosis Date    Anticoagulant long-term use     Arthritis     BPH (benign prostatic hyperplasia)     Chronic back pain     with injections Dr. Ephraim Hightower (Pain Management)    Coronary artery disease     Hypertension     PVD (peripheral vascular disease)      Past Surgical History:   Procedure Laterality Date    CORONARY STENT PLACEMENT      EYE SURGERY      bilateral cataracts    KIDNEY STONE SURGERY      PROSTATECTOMY  7/2013    TURP    VASCULAR SURGERY       Social History     Social History    Marital status: Legally       Spouse name: N/A    Number of children: N/A    Years of education: N/A     Occupational History    Not on file.     Social History Main Topics    Smoking status: Former Smoker    Smokeless tobacco: Not on file      Comment: quit 5 yrs ago    Alcohol use No    Drug use: No    Sexual activity: Not on file     Other Topics Concern    Not on file     Social History Narrative     No family history on file.    Review of patient's allergies indicates:   Allergen Reactions    Ciprofloxacin Nausea And Vomiting     Fever, chills, diarrhea    Benadryl [diphenhydramine hcl] Other (See Comments)     Unable to urinate       Current Outpatient Prescriptions   Medication Sig    amlodipine (NORVASC) 5 MG tablet Take 5 mg by mouth once daily.    aspirin (ECOTRIN) 81 MG EC tablet Take 81 mg by mouth once daily.    clopidogrel (PLAVIX) 75 mg tablet     doxazosin (CARDURA XL) 8 mg 24 hr tablet Take 8 mg by mouth daily with breakfast.    finasteride (PROSCAR) 5 mg tablet Take 5 mg by mouth once daily.    FLUVIRIN 3423-7508 45 mcg (15 mcg x 3)/0.5 mL Susp     lovastatin (MEVACOR) 40 MG tablet Take 40 mg by mouth every evening.    meloxicam (MOBIC) 15 MG tablet Take 1 tablet (15 mg total) by mouth once daily.    predniSONE (DELTASONE) 5 MG tablet Take 5 mg by mouth once daily.    saw palmetto 500 MG capsule Take 500 mg by mouth 2 (two) times daily.    URINARY LEG BAG Kit 1     No current facility-administered medications for this visit.      REVIEW OF SYSTEMS:     GENERAL:  No weight loss, malaise or fevers.  HEENT:   No recent changes in vision or hearing  NECK:  Negative for lumps, no difficulty with swallowing.  RESPIRATORY:  Negative for cough, wheezing or shortness of breath, patient denies any recent URI.  CARDIOVASCULAR:  Negative for chest pain, or palpitations. HTN, CAD.   GI:  Negative for abdominal discomfort, blood in stools or black stools or change in bowel habits.  MUSCULOSKELETAL:  See  "HPI.  SKIN:  Negative for lesions, rash, and itching.  PSYCH:  No mood disorder or recent psychosocial stressors.  Patients sleep is not disturbed secondary to pain.  HEMATOLOGY/LYMPHOLOGY:  Negative for prolonged bleeding or swollen nodes. Patient reports bruising easily secondary to blood thinners. Patient is currently taking Plavix and aspiring.  ENDO: No history of diabetes or thyroid dysfunction  NEURO:   No history of headaches, syncope, paralysis, seizures or tremors.  All other reviewed and negative other than HPI.        OBJECTIVE:    BP (!) 157/79  Pulse 109  Ht 5' 10" (1.778 m)  Wt 101.6 kg (224 lb)  BMI 32.14 kg/m2      PHYSICAL EXAMINATION:     GENERAL: Well appearing, in no acute distress, alert and oriented x3.  PSYCH:  Mood and affect appropriate.  SKIN: Skin color, texture, turgor normal, no rashes or lesions.  HEAD/FACE:  Normocephalic, atraumatic. Cranial nerves grossly intact.  CV: RRR with palpation of the radial artery.  PULM: No evidence of respiratory difficulty, symmetric chest rise.  GI:  Soft and non-tender.  BACK: Straight leg raising in the supine position is negative to radicular pain. No pain with palpation over the facet joints of the lumbar spine.  Full ROM with pain on flexion and extension.  Positive facet loading bilaterally.  EXTREMITIES: Lower extremity peripheral joint ROM is full and pain free without obvious instability or laxity in all extremities. No deformities, edema, or skin discoloration. Good capillary refill. 4/5 strength in right ankle with plantar and dorsiflexion. 4/5 strength in left ankle with plantar and dorsiflexion. 5/5 strength with right knee flexion and extension. 5/5 strength with left knee flexion and extension. 5/5 strength in right EHL, 5/5 strength in left EHL.     MUSCULOSKELETAL: Shoulder, hip, and knee provocative maneuvers are negative.  There is no pain with palpation over the sacroiliac joints bilaterally.  FABERs test is negative.  FADIRs " test is negative.   Bilateral upper and lower extremity strength is normal and symmetric.  No atrophy or tone abnormalities are noted.  NEURO: Bilateral upper and lower extremity coordination and muscle stretch reflexes are physiologic and symmetric.  Plantar response are downgoing. No clonus.  No loss of sensation is noted.  GAIT: Antalgic- ambulates without assistance.      ASSESSMENT: 86 y.o. year old male with lower back pain, consistent with the following diagnoses        No diagnosis found.    PLAN:      - Previous imaging was reviewed and discussed with the patient today.     - Schedule for L4-5 IL LEONIDES. The procedure, risks, benefits and options were discussed with patient. There are no contraindications to the procedure. The patient expressed understanding and agreed to proceed.  Consent obtained today.     - In the future, if epidural does not relieve his pain, we may consider new imaging. His previous MRI is from 2009.      - We may also consider medial branch blocks as he does have components of lumbar facet arthritis.      - Counseled patient regarding the importance of a continued home exercise routine.     - RTC 2 weeks after above procedure.      The above plan and management options were discussed at length with patient. Patient is in agreement with the above and verbalized understanding. It will be communicated with the referring physician via electronic record, fax, or mail.      The above plan and management options were discussed at length with patient. Patient is in agreement with the above and verbalized understanding. It will be communicated with the referring physician via electronic record, fax, or mail.    [place] Ho  03/09/2017

## 2017-03-28 ENCOUNTER — HOSPITAL ENCOUNTER (EMERGENCY)
Facility: OTHER | Age: 82
Discharge: HOME | End: 2017-03-28
Attending: EMERGENCY MEDICINE
Payer: MEDICARE

## 2017-03-28 VITALS
BODY MASS INDEX: 31.92 KG/M2 | DIASTOLIC BLOOD PRESSURE: 70 MMHG | RESPIRATION RATE: 18 BRPM | WEIGHT: 223 LBS | OXYGEN SATURATION: 99 % | HEART RATE: 70 BPM | SYSTOLIC BLOOD PRESSURE: 150 MMHG | HEIGHT: 70 IN | TEMPERATURE: 98 F

## 2017-03-28 DIAGNOSIS — N30.01 ACUTE CYSTITIS WITH HEMATURIA: Primary | ICD-10-CM

## 2017-03-28 LAB
ALBUMIN SERPL BCP-MCNC: 3.5 G/DL
ALP SERPL-CCNC: 81 U/L
ALT SERPL W/O P-5'-P-CCNC: 29 U/L
ANION GAP SERPL CALC-SCNC: 10 MMOL/L
ANISOCYTOSIS BLD QL SMEAR: SLIGHT
AST SERPL-CCNC: 25 U/L
BACTERIA #/AREA URNS HPF: ABNORMAL /HPF
BASOPHILS # BLD AUTO: 0.02 K/UL
BASOPHILS NFR BLD: 0.3 %
BILIRUB SERPL-MCNC: 0.4 MG/DL
BILIRUB UR QL STRIP: ABNORMAL
BUN SERPL-MCNC: 15 MG/DL
CALCIUM SERPL-MCNC: 8.6 MG/DL
CHLORIDE SERPL-SCNC: 109 MMOL/L
CLARITY UR: ABNORMAL
CO2 SERPL-SCNC: 22 MMOL/L
COLOR UR: YELLOW
CREAT SERPL-MCNC: 1.1 MG/DL
DIFFERENTIAL METHOD: ABNORMAL
EOSINOPHIL # BLD AUTO: 0.2 K/UL
EOSINOPHIL NFR BLD: 2.3 %
ERYTHROCYTE [DISTWIDTH] IN BLOOD BY AUTOMATED COUNT: ABNORMAL %
EST. GFR  (AFRICAN AMERICAN): >60 ML/MIN/1.73 M^2
EST. GFR  (NON AFRICAN AMERICAN): >60 ML/MIN/1.73 M^2
GIANT PLATELETS BLD QL SMEAR: PRESENT
GLUCOSE SERPL-MCNC: 105 MG/DL
GLUCOSE UR QL STRIP: NEGATIVE
HCT VFR BLD AUTO: 36.5 %
HGB BLD-MCNC: 10.7 G/DL
HGB UR QL STRIP: ABNORMAL
HYALINE CASTS #/AREA URNS LPF: 0 /LPF
KETONES UR QL STRIP: NEGATIVE
LEUKOCYTE ESTERASE UR QL STRIP: ABNORMAL
LYMPHOCYTES # BLD AUTO: 0.9 K/UL
LYMPHOCYTES NFR BLD: 12.1 %
MCH RBC QN AUTO: 22.1 PG
MCHC RBC AUTO-ENTMCNC: 29.3 %
MCV RBC AUTO: 75 FL
MICROSCOPIC COMMENT: ABNORMAL
MONOCYTES # BLD AUTO: 0.7 K/UL
MONOCYTES NFR BLD: 8.6 %
NEUTROPHILS # BLD AUTO: 5.9 K/UL
NEUTROPHILS NFR BLD: 76.7 %
NITRITE UR QL STRIP: NEGATIVE
PH UR STRIP: 6 [PH] (ref 5–8)
PLATELET # BLD AUTO: 203 K/UL
PLATELET BLD QL SMEAR: ABNORMAL
PMV BLD AUTO: ABNORMAL FL
POIKILOCYTOSIS BLD QL SMEAR: SLIGHT
POLYCHROMASIA BLD QL SMEAR: ABNORMAL
POTASSIUM SERPL-SCNC: 3.5 MMOL/L
PROT SERPL-MCNC: 7.1 G/DL
PROT UR QL STRIP: ABNORMAL
RBC # BLD AUTO: 4.84 M/UL
RBC #/AREA URNS HPF: 100 /HPF (ref 0–4)
SCHISTOCYTES BLD QL SMEAR: ABNORMAL
SCHISTOCYTES BLD QL SMEAR: PRESENT
SODIUM SERPL-SCNC: 141 MMOL/L
SP GR UR STRIP: >=1.03 (ref 1–1.03)
SQUAMOUS #/AREA URNS HPF: 6 /HPF
URN SPEC COLLECT METH UR: ABNORMAL
UROBILINOGEN UR STRIP-ACNC: NEGATIVE EU/DL
WBC # BLD AUTO: 7.66 K/UL
WBC #/AREA URNS HPF: 100 /HPF (ref 0–5)
WBC CLUMPS URNS QL MICRO: ABNORMAL

## 2017-03-28 PROCEDURE — 87186 SC STD MICRODIL/AGAR DIL: CPT

## 2017-03-28 PROCEDURE — 99283 EMERGENCY DEPT VISIT LOW MDM: CPT | Mod: 25

## 2017-03-28 PROCEDURE — 87077 CULTURE AEROBIC IDENTIFY: CPT

## 2017-03-28 PROCEDURE — 81000 URINALYSIS NONAUTO W/SCOPE: CPT

## 2017-03-28 PROCEDURE — 63600175 PHARM REV CODE 636 W HCPCS: Performed by: EMERGENCY MEDICINE

## 2017-03-28 PROCEDURE — 80053 COMPREHEN METABOLIC PANEL: CPT

## 2017-03-28 PROCEDURE — 87088 URINE BACTERIA CULTURE: CPT

## 2017-03-28 PROCEDURE — 85025 COMPLETE CBC W/AUTO DIFF WBC: CPT

## 2017-03-28 PROCEDURE — 87086 URINE CULTURE/COLONY COUNT: CPT

## 2017-03-28 PROCEDURE — 96365 THER/PROPH/DIAG IV INF INIT: CPT

## 2017-03-28 RX ORDER — CEPHALEXIN 500 MG/1
500 CAPSULE ORAL 4 TIMES DAILY
Qty: 20 CAPSULE | Refills: 0 | Status: SHIPPED | OUTPATIENT
Start: 2017-03-28 | End: 2017-04-02

## 2017-03-28 RX ORDER — ETODOLAC 400 MG/1
400 TABLET, FILM COATED ORAL 2 TIMES DAILY
COMMUNITY

## 2017-03-28 RX ADMIN — CEFTRIAXONE 1 G: 1 INJECTION, SOLUTION INTRAVENOUS at 10:03

## 2017-03-28 NOTE — ED NOTES
Pt resting comfortably on stretcher with daughter at bedside. Pt denies pain or needs at this time. Respirations even and unlabored, no distress noted. Will continue to monitor.

## 2017-03-28 NOTE — ED PROVIDER NOTES
"Encounter Date: 3/28/2017    SCRIBE #1 NOTE: I, Kate Joya, am scribing for, and in the presence of, Dr. Gordillo.       History     Chief Complaint   Patient presents with    Hematuria     pt reports hematuria  with a discharge that started this morning; reports dysuria also since Sunday; denies any abdominal pain and no N/V/D     Review of patient's allergies indicates:   Allergen Reactions    Ciprofloxacin Nausea And Vomiting     Fever, chills, diarrhea    Benadryl [diphenhydramine hcl] Other (See Comments)     Unable to urinate     HPI Comments:   Time seen by provider: 9:09 AM    The patient is a 86 y.o. male with HTN and CAD who presents to the ED with an onset of "blood-tinged cloudy urine" this morning with associated dysuria for the last 2 days and a dull left sided HA. He is concerned for a UTI since his symptoms are worsening. The patient has had a SHx including a TURP in 2013. He denies fever, chills, vomiting, flank pain, or any other symptoms at this time.   The history is provided by the patient.     Past Medical History:   Diagnosis Date    Anticoagulant long-term use     Arthritis     BPH (benign prostatic hyperplasia)     Chronic back pain     with injections Dr. Ephraim Hightower (Pain Management)    Coronary artery disease     Hypertension     PVD (peripheral vascular disease)      Past Surgical History:   Procedure Laterality Date    CORONARY STENT PLACEMENT      EYE SURGERY      bilateral cataracts    KIDNEY STONE SURGERY      PROSTATECTOMY  7/2013    TURP    VASCULAR SURGERY       History reviewed. No pertinent family history.  Social History   Substance Use Topics    Smoking status: Former Smoker    Smokeless tobacco: None      Comment: quit 5 yrs ago    Alcohol use No     Review of Systems   Constitutional: Negative for chills and fever.   HENT: Negative for congestion, rhinorrhea, sneezing and sore throat.    Eyes: Negative for visual disturbance.   Respiratory: Negative " for cough and shortness of breath.    Cardiovascular: Negative for chest pain and palpitations.   Gastrointestinal: Negative for abdominal pain, diarrhea, nausea and vomiting.   Genitourinary: Positive for dysuria and hematuria.   Musculoskeletal: Negative for back pain and neck pain.   Skin: Negative for rash.   Neurological: Positive for headaches. Negative for seizures and syncope.     Physical Exam   Initial Vitals   BP Pulse Resp Temp SpO2   03/28/17 0823 03/28/17 0823 03/28/17 0823 03/28/17 0823 03/28/17 0823   176/75 80 18 97.6 °F (36.4 °C) 98 %     Physical Exam    Nursing note and vitals reviewed.  Constitutional: He appears well-developed and well-nourished. He is not diaphoretic. No distress.   HENT:   Head: Normocephalic and atraumatic.   Mouth/Throat: Oropharynx is clear and moist.   No tenderness to the temporal artery.    Eyes: Conjunctivae and EOM are normal. Pupils are equal, round, and reactive to light.   Neck: Neck supple.   Cardiovascular: Normal rate, regular rhythm and normal heart sounds.   No murmur heard.  Pulmonary/Chest: Breath sounds normal. No respiratory distress. He has no wheezes. He has no rhonchi. He has no rales.   Abdominal: Soft. Bowel sounds are normal. He exhibits no distension and no mass. There is no tenderness. There is no rebound and no guarding.   Genitourinary:   Genitourinary Comments: Normal genital exam. No masses, lesions, or discharge.    Musculoskeletal:   Atraumatic extremities.    Neurological: He is alert and oriented to person, place, and time. He has normal strength. No cranial nerve deficit or sensory deficit.   Skin: Skin is warm and dry. No rash noted.   Psychiatric: He has a normal mood and affect. His speech is normal.       ED Course   Procedures  Labs Reviewed   URINALYSIS - Abnormal; Notable for the following:        Result Value    Appearance, UA Cloudy (*)     Specific Gravity, UA >=1.030 (*)     Protein, UA 2+ (*)     Bilirubin (UA) 3+ (*)      Occult Blood UA 3+ (*)     Leukocytes, UA 2+ (*)     All other components within normal limits   URINALYSIS MICROSCOPIC - Abnormal; Notable for the following:     RBC,  (*)     WBC,  (*)     WBC Clumps, UA Few (*)     Bacteria, UA Few (*)     All other components within normal limits   CBC W/ AUTO DIFFERENTIAL - Abnormal; Notable for the following:     Hemoglobin 10.7 (*)     Hematocrit 36.5 (*)     MCV 75 (*)     MCH 22.1 (*)     MCHC 29.3 (*)     All other components within normal limits   COMPREHENSIVE METABOLIC PANEL - Abnormal; Notable for the following:     CO2 22 (*)     Calcium 8.6 (*)     All other components within normal limits   CULTURE, URINE           Medical Decision Making:   History:   Old Medical Records: I decided to obtain old medical records.  Initial Assessment:   Patient presents with UTI-like symptoms.   Differential Diagnosis:   I suspect that he has a UTI.   Clinical Tests:   Lab Tests: Reviewed and Ordered  ED Management:  Will check basic labs.     10:15 AM  Reviewed studies; blood work looks normal. He has blood in white cells in urine. Reviewed previous culture; will give a dose of Rocephin and discharge home on Keflex as an antibiotics.             Scribe Attestation:   Scribe #1: I performed the above scribed service and the documentation accurately describes the services I performed. I attest to the accuracy of the note.    Attending Attestation:           Physician Attestation for Scribe:  Physician Attestation Statement for Scribe #1: I, Dr. Gordillo, reviewed documentation, as scribed by Kate Joya in my presence, and it is both accurate and complete.                 ED Course     Clinical Impression:     1. Acute cystitis with hematuria          Disposition:   Disposition: Discharged  Condition: Stable       Kalyan Gordillo MD  03/30/17 [id number]

## 2017-03-28 NOTE — DISCHARGE INSTRUCTIONS
Anatomy of the Male Urinary Tract  Your urinary tract helps to get rid of your bodys liquid waste. The kidneys constantly filter the blood to collect unneeded chemicals and water, making urine. Urine travels through the ureters to the bladder. The bladder fills with urine, holding it until youre ready to release it. Signals from the brain tell the sphincter (muscles around the opening of the bladder) when to let urine flow out of the bladder. The urethra is the tube that carries urine from the bladder out of the body. In men, the prostate gland wraps around the urethra near the bladder.     Front view of male urinary tract.     Date Last Reviewed: 8/27/2014  © 7337-3307 [employer]. [address] [place] [address], [place] [address]. All rights reserved. This information is not intended as a substitute for professional medical care. Always follow your healthcare professional's instructions.

## 2017-03-28 NOTE — ED AVS SNAPSHOT
[facility]  [address]  [facility] [address]               Jemkelli Pado   3/28/2017  8:36 AM   ED    Description:  Male : 1930   Department:  [facility]           Your Care was Coordinated By:     Provider Role From To    Kalyan Gordillo MD Attending Provider 17 0854 --      Reason for Visit     Hematuria           Diagnoses this Visit        Comments    Acute cystitis with hematuria    -  Primary       ED Disposition     None           To Do List           Follow-up Information     Schedule an appointment as soon as possible for a visit with Sandeep Richey Iii, MD.    Specialty:  Internal Medicine    Contact information:    [address]  [address]  [facility] [address] [phone]          Follow up with [facility].    Specialty:  Emergency Medicine    Why:  If symptoms worsen    Contact information:    [address]  [facility] [address] [phone]       These Medications        Disp Refills Start End    cephALEXin (KEFLEX) 500 MG capsule 20 capsule 0 3/28/2017 2017    Take 1 capsule (500 mg total) by mouth 4 (four) times daily. - Oral    [facility] [address] [place] [address] AT [facility] Ph #: [phone]         [facility]     [facility] Nurse Care Line -  Assistance  Registered nurses in the [facility] provide clinical advisement, health education, appointment booking, and other advisory services.  Call for this free service at [phone].             Medications           Message regarding Medications     Verify the changes and/or additions to your medication regime listed below are the same as discussed with your clinician today.  If any of these changes or additions are incorrect, please notify your healthcare provider.        START taking these NEW medications        Refills     "cephALEXin (KEFLEX) 500 MG capsule 0    Sig: Take 1 capsule (500 mg total) by mouth 4 (four) times daily.    Class: Print    Route: Oral      These medications were administered today        Dose Freq    cefTRIAXone (ROCEPHIN) 1 g in dextrose 5 % 50 mL IVPB 1 g ED 1 Time    Sig: Inject 50 mLs (1 g total) into the vein ED 1 Time.    Class: Normal    Route: Intravenous      STOP taking these medications     FLUVIRIN [id number] 45 mcg (15 mcg x 3)/0.5 mL Susp     meloxicam (MOBIC) 15 MG tablet Take 1 tablet (15 mg total) by mouth once daily.    URINARY LEG BAG Kit 1           Verify that the below list of medications is an accurate representation of the medications you are currently taking.  If none reported, the list may be blank. If incorrect, please contact your healthcare provider. Carry this list with you in case of emergency.           Current Medications     amlodipine (NORVASC) 5 MG tablet Take 5 mg by mouth once daily.    aspirin (ECOTRIN) 81 MG EC tablet Take 81 mg by mouth once daily.    clopidogrel (PLAVIX) 75 mg tablet     doxazosin (CARDURA XL) 8 mg 24 hr tablet Take 8 mg by mouth daily with breakfast.    etodolac (LODINE) 400 MG tablet Take 400 mg by mouth 2 (two) times daily.    finasteride (PROSCAR) 5 mg tablet Take 5 mg by mouth once daily.    lovastatin (MEVACOR) 40 MG tablet Take 40 mg by mouth every evening.    predniSONE (DELTASONE) 5 MG tablet Take 5 mg by mouth once daily.    saw palmetto 500 MG capsule Take 500 mg by mouth 2 (two) times daily.    cefTRIAXone (ROCEPHIN) 1 g in dextrose 5 % 50 mL IVPB Inject 50 mLs (1 g total) into the vein ED 1 Time.    cephALEXin (KEFLEX) 500 MG capsule Take 1 capsule (500 mg total) by mouth 4 (four) times daily.           Clinical Reference Information           Your Vitals Were     BP Pulse Temp Resp Height Weight    150/68 66 97.6 °F (36.4 °C) (Oral) 18 5' 10" (1.778 m) 101.2 kg (223 lb)    SpO2 BMI             99% 32 kg/m2         Allergies as of 3/28/2017  "       Reactions    Ciprofloxacin Nausea And Vomiting    Fever, chills, diarrhea    Benadryl [Diphenhydramine Hcl] Other (See Comments)    Unable to urinate      Immunizations Administered on Date of Encounter - 3/28/2017     None      ED Micro, Lab, POCT     Start Ordered       Status Ordering Provider    03/28/17 0909 03/28/17 0908  Urine culture **CANNOT BE ORDERED STAT**  Once      In process     03/28/17 0908 03/28/17 0908  CBC auto differential  STAT      Preliminary result     03/28/17 0908 03/28/17 0908  Comprehensive metabolic panel  STAT      Final result     03/28/17 0825 03/28/17 0824  Urinalysis Clean Catch  STAT      Final result     03/28/17 0824 03/28/17 0824  Urinalysis Microscopic  Once      Final result       ED Imaging Orders     None        Discharge Instructions         Anatomy of the Male Urinary Tract  Your urinary tract helps to get rid of your bodys liquid waste. The kidneys constantly filter the blood to collect unneeded chemicals and water, making urine. Urine travels through the ureters to the bladder. The bladder fills with urine, holding it until youre ready to release it. Signals from the brain tell the sphincter (muscles around the opening of the bladder) when to let urine flow out of the bladder. The urethra is the tube that carries urine from the bladder out of the body. In men, the prostate gland wraps around the urethra near the bladder.     Front view of male urinary tract.     Date Last Reviewed: 8/27/2014  © 6571-8522 [employer]. [address] [place] [address]. All rights reserved. This information is not intended as a substitute for professional medical care. Always follow your healthcare professional's instructions.          Your Scheduled Appointments     Apr 10, 2017 11:20 AM CDT   Established Patient Visit with Herbie Soriano MD   [facility]    [address], [address]  [facility] [address]    [phone]              Smoking Cessation     If you would like to quit smoking:   You may be eligible for free services if you are a [place] resident and started smoking cigarettes before September 1, 1988.  Call the Smoking Cessation Trust (SCT) toll free at (938) 155-8317 or (231) 211-6787.   Call 1-800-QUIT-NOW if you do not meet the above criteria.             [facility] complies with applicable Federal civil rights laws and does not discriminate on the basis of race, color, national origin, age, disability, or sex.        Language Assistance Services     ATTENTION: Language assistance services are available, free of charge. Please call [phone].      ATENCIÓN: Si habla español, tiene a madrid disposición servicios gratuitos de asistencia lingüística. Llame al [phone].     CHÚ Ý: N?u b?n nói Ti?ng Vi?t, có các d?ch v? h? tr? ngôn ng? mi?n phí dành cho b?n. G?i s? [phone].

## 2017-03-28 NOTE — ED NOTES
"Pt to ED c/o dysuria and yellow discharge that started Sunday and hematuria that started this AM. Pt reports frequency, urgency, and dribbling with urination. Pt states he had "a procedure done in the past for this". Pt reports SOB but is being seen and treated by Dr. Soriano. Denies CP, N/V, fever, chills, cough. Pt AAOx4 and appropriate at this time. Respirations even and unlabored. No acute distress noted.  Bed is locked and in lowest position with side rails up x2. Call bell within reach and pt oriented to use of call bell. Pt on continuous pulse ox, and continuous BP cuff. Will continue to monitor.     "

## 2017-03-31 LAB — BACTERIA UR CULT: NORMAL

## 2017-04-07 ENCOUNTER — TELEPHONE (OUTPATIENT)
Dept: PAIN MEDICINE | Facility: CLINIC | Age: 82
End: 2017-04-07

## 2017-04-07 NOTE — TELEPHONE ENCOUNTER
Spoke with patient and I faxed clearance request for him to stop his Plavix 7 days before being scheduled for Lumbar ILESI   To Dr. Richey faxed to [phone].

## 2017-04-13 PROBLEM — Z87.891 EX-SMOKER: Status: ACTIVE | Noted: 2017-04-13

## 2017-04-20 ENCOUNTER — HOSPITAL ENCOUNTER (OUTPATIENT)
Dept: PREADMISSION TESTING | Facility: OTHER | Age: 82
Discharge: HOME | End: 2017-04-20
Attending: INTERNAL MEDICINE
Payer: MEDICARE

## 2017-04-20 VITALS
DIASTOLIC BLOOD PRESSURE: 77 MMHG | WEIGHT: 220 LBS | SYSTOLIC BLOOD PRESSURE: 154 MMHG | HEIGHT: 70 IN | OXYGEN SATURATION: 98 % | RESPIRATION RATE: 16 BRPM | TEMPERATURE: 98 F | BODY MASS INDEX: 31.5 KG/M2 | HEART RATE: 66 BPM

## 2017-04-20 DIAGNOSIS — I73.9 PERIPHERAL VASCULAR DISEASE, UNSPECIFIED: Primary | ICD-10-CM

## 2017-04-20 LAB
ANION GAP SERPL CALC-SCNC: 13 MMOL/L
ANISOCYTOSIS BLD QL SMEAR: ABNORMAL
APTT BLDCRRT: 26 SEC
BASOPHILS # BLD AUTO: 0.03 K/UL
BASOPHILS NFR BLD: 0.5 %
BUN SERPL-MCNC: 13 MG/DL
CALCIUM SERPL-MCNC: 8.6 MG/DL
CHLORIDE SERPL-SCNC: 110 MMOL/L
CO2 SERPL-SCNC: 22 MMOL/L
CREAT SERPL-MCNC: 1.1 MG/DL
DIFFERENTIAL METHOD: ABNORMAL
EOSINOPHIL # BLD AUTO: 0.2 K/UL
EOSINOPHIL NFR BLD: 3.5 %
ERYTHROCYTE [DISTWIDTH] IN BLOOD BY AUTOMATED COUNT: 28.1 %
EST. GFR  (AFRICAN AMERICAN): >60 ML/MIN/1.73 M^2
EST. GFR  (NON AFRICAN AMERICAN): >60 ML/MIN/1.73 M^2
GIANT PLATELETS BLD QL SMEAR: PRESENT
GLUCOSE SERPL-MCNC: 85 MG/DL
HCT VFR BLD AUTO: 36.7 %
HGB BLD-MCNC: 11.2 G/DL
HYPOCHROMIA BLD QL SMEAR: ABNORMAL
INR PPP: 1
LYMPHOCYTES # BLD AUTO: 1.1 K/UL
LYMPHOCYTES NFR BLD: 17.5 %
MCH RBC QN AUTO: 23.8 PG
MCHC RBC AUTO-ENTMCNC: 30.5 %
MCV RBC AUTO: 78 FL
MONOCYTES # BLD AUTO: 0.4 K/UL
MONOCYTES NFR BLD: 6.3 %
NEUTROPHILS # BLD AUTO: 4.7 K/UL
NEUTROPHILS NFR BLD: 72.2 %
OVALOCYTES BLD QL SMEAR: ABNORMAL
PLATELET # BLD AUTO: 226 K/UL
PLATELET BLD QL SMEAR: ABNORMAL
PMV BLD AUTO: ABNORMAL FL
POIKILOCYTOSIS BLD QL SMEAR: ABNORMAL
POLYCHROMASIA BLD QL SMEAR: ABNORMAL
POTASSIUM SERPL-SCNC: 3.4 MMOL/L
PROTHROMBIN TIME: 10.8 SEC
RBC # BLD AUTO: 4.71 M/UL
SCHISTOCYTES BLD QL SMEAR: PRESENT
SODIUM SERPL-SCNC: 145 MMOL/L
WBC # BLD AUTO: 6.5 K/UL

## 2017-04-20 PROCEDURE — 85610 PROTHROMBIN TIME: CPT

## 2017-04-20 PROCEDURE — 36415 COLL VENOUS BLD VENIPUNCTURE: CPT

## 2017-04-20 PROCEDURE — 85025 COMPLETE CBC W/AUTO DIFF WBC: CPT

## 2017-04-20 PROCEDURE — 93005 ELECTROCARDIOGRAM TRACING: CPT

## 2017-04-20 PROCEDURE — 93010 ELECTROCARDIOGRAM REPORT: CPT | Mod: ,,, | Performed by: INTERNAL MEDICINE

## 2017-04-20 PROCEDURE — 80048 BASIC METABOLIC PNL TOTAL CA: CPT

## 2017-04-20 PROCEDURE — 85730 THROMBOPLASTIN TIME PARTIAL: CPT

## 2017-04-20 RX ORDER — FERROUS SULFATE 325(65) MG
325 TABLET ORAL
COMMUNITY

## 2017-04-20 NOTE — IP AVS SNAPSHOT
[facility] Location ([place])  [address] [place], [place] [address]  Phone: [phone]           Patient Discharge Instructions  Our goal is to set you up for success. This packet includes information on your condition, medications, and your home care. It will help you care for yourself to prevent having to return to the hospital.     Please ask your nurse if you have any questions.      There are many details to remember when preparing for your surgery. Here is what you will need to do, please ask your nurse if there are more specific instructions and if you have any questions:    1. Before procedure Do not smoke or drink alcoholic beverages 24 hours prior to your procedure. Do not eat or drink anything 8 hours before your procedure - this includes gum, mints, and candy.     2. Day of procedure Please remove all jewelry for the procedure. If you wear contact lenses, dentures, hearing aids or glasses, bring a container to put them in during your surgery and give to a family member.  If your doctor has scheduled you for an overnight stay, bring a small overnight bag with any personal items that you need.      3. After procedure  Make arrangements in advance for transportation home by a responsible adult. It is not safe to drive a vehicle during the 24 hours following surgery.     PLEASE NOTE: You may be contacted the day before your surgery to confirm your surgery date and arrival time. The Surgery schedule has many variables which may affect the time of your surgery case. Family members should be available if your surgery time changes.           [facility]  Unless otherwise directed by your provider, please   contact [facility], our nurse care line   that is available for 24/7 assistance.     [phone] (toll-free)     Registered nurses in the [facility]   provide: appointment scheduling, clinical advisement, health education, and other advisory services.                  **  Verify the list of medication(s) below is accurate and up to date. Carry this with you in case of emergency. If your medications have changed, please notify your healthcare provider.             Medication List      TAKE these medications        Additional Info                      amlodipine 5 MG tablet   Commonly known as:  NORVASC   Refills:  0   Dose:  5 mg    Instructions:  Take 5 mg by mouth once daily.     Begin Date    AM    Noon    PM    Bedtime       aspirin 81 MG EC tablet   Commonly known as:  ECOTRIN   Refills:  0   Dose:  81 mg    Instructions:  Take 81 mg by mouth once daily.     Begin Date    AM    Noon    PM    Bedtime       clopidogrel 75 mg tablet   Commonly known as:  PLAVIX   Refills:  3      Begin Date    AM    Noon    PM    Bedtime       doxazosin 8 mg 24 hr tablet   Commonly known as:  CARDURA XL   Refills:  0   Dose:  8 mg    Instructions:  Take 8 mg by mouth daily with breakfast.     Begin Date    AM    Noon    PM    Bedtime       etodolac 400 MG tablet   Commonly known as:  LODINE   Refills:  0   Dose:  400 mg    Instructions:  Take 400 mg by mouth 2 (two) times daily.     Begin Date    AM    Noon    PM    Bedtime       ferrous sulfate 325 mg (65 mg iron) Tab tablet   Refills:  0   Dose:  325 mg    Instructions:  Take 325 mg by mouth daily with breakfast.     Begin Date    AM    Noon    PM    Bedtime       finasteride 5 mg tablet   Commonly known as:  PROSCAR   Refills:  0   Dose:  5 mg    Instructions:  Take 5 mg by mouth once daily.     Begin Date    AM    Noon    PM    Bedtime       lovastatin 40 MG tablet   Commonly known as:  MEVACOR   Refills:  0   Dose:  40 mg    Instructions:  Take 40 mg by mouth every evening.     Begin Date    AM    Noon    PM    Bedtime       predniSONE 5 MG tablet   Commonly known as:  DELTASONE   Refills:  0   Dose:  5 mg    Instructions:  Take 5 mg by mouth once daily.     Begin Date    AM    Noon    PM    Bedtime       saw palmetto 500 MG capsule   Refills:   0   Dose:  500 mg    Instructions:  Take 500 mg by mouth 2 (two) times daily.     Begin Date    AM    Noon    PM    Bedtime                  Please bring to all follow up appointments:    1. A copy of your discharge instructions.  2. All medicines you are currently taking in their original bottles.  3. Identification and insurance card.    Please arrive 15 minutes ahead of scheduled appointment time.    Please call 24 hours in advance if you must reschedule your appointment and/or time.        Your Future Surgeries/Procedures     Apr 21, 2017   Surgery with Herbie Soriano MD   [facility] ([facility])    [address] [place] [id number]   [phone]                  Discharge Instructions       PRE-ADMIT TESTING -  [phone]    [address] [place] [address]        OUTPATIENT SURGERY UNIT - [phone]    Your surgery has been scheduled at [facility]. We are pleased to have the opportunity to serve you. For Further Information please call [phone].    On the day of surgery please report to the Information Desk on the 1st floor.    CONTACT YOUR PHYSICIAN'S OFFICE THE DAY PRIOR TO YOUR SURGERY TO OBTAIN YOUR ARRIVAL TIME.     The evening before surgery do not eat anything after 9 p.m. ( this includes hard candy, chewing gum and mints).  You may have GATORADE, POWERADE AND WATER FROM 9 p.m. until leaving home to come to the hospital.   DO NOT DRINK ANY LIQUIDS ON THE WAY TO THE HOSPITAL.     SPECIAL MEDICATION INSTRUCTIONS: TAKE medications checked off by the Anesthesiologist on your Medication List.    Angiogram Patients: Take medications as instructed by your physician, including aspirin.     Surgery Patients:    If you take ASPIRIN - Your PHYSICIAN/SURGEON will need to inform you IF/OR when you need to stop taking aspirin prior to your surgery.     Do Not take any medications containing IBUPROFEN.  Do Not Wear any make-up or dark  nail polish   (especially eye make-up) to surgery. If you come to surgery with makeup on you will be required to remove the makeup or nail polish.    Do not shave your surgical area at least 5 days prior to your surgery. The surgical prep will be performed at the hospital according to Infection Control regulations.    Leave all valuables at home.   Do Not wear any jewelry or watches, including any metal in body piercings.  Contact Lens must be removed before surgery. Either do not wear the contact lens or bring a case and solution for storage.  Please bring a container for eyeglasses or dentures as required.  Bring any paperwork your physician has provided, such as consent forms,  history and physicals, doctor's orders, etc.   Bring comfortable clothes that are loose fitting to wear upon discharge. Take into consideration the type of surgery being performed.  Maintain your diet as advised per your physician the day prior to surgery.      Adequate rest the night before surgery is advised.   Park in the Parking lot behind the hospital or in the [place] [facility] across the street from the parking lot. Parking is complimentary.  If you will be discharged the same day as your procedure, please arrange for a responsible adult to drive you home or to accompany you if traveling by taxi.   YOU WILL NOT BE PERMITTED TO DRIVE OR TO LEAVE THE HOSPITAL ALONE AFTER SURGERY.   It is strongly recommended that you arrange for someone to remain with you for the first 24 hrs following your surgery.       Thank you for your cooperation.  The Staff of [facility].        Bathing Instructions                                                                 Please shower the evening before and morning of your procedure with    ANTIBACTERIAL SOAP. ( DIAL, etc )  Concentrate on the surgical area   for at least 3 minutes and rinse completely. Dry off as usual.   Do not use any deodorant, powder, body lotions,  "perfume, after shave or    cologne.                                                Admission Information     Date & Time Provider Department CSN    4/20/2017 10:00 AM Herbie Soriano MD [facility] [id number]      Care Providers     Provider Role Specialty Primary office phone    Herbie Soriano MD Attending Provider Cardiology [phone]      Your Vitals Were     BP Pulse Temp Resp Height Weight    154/77 66 98.4 °F (36.9 °C) 16 5' 10" (1.778 m) 99.8 kg (220 lb)    SpO2 BMI             98% 31.57 kg/m2         Recent Lab Values     No lab values to display.      Allergies as of 4/20/2017        Reactions    Ciprofloxacin Nausea And Vomiting    Fever, chills, diarrhea    Benadryl [Diphenhydramine Hcl] Other (See Comments)    Unable to urinate      Advance Directives     An advance directive is a document which, in the event you are no longer able to make decisions for yourself, tells your healthcare team what kind of treatment you do or do not want to receive, or who you would like to make those decisions for you.  If you do not currently have an advance directive, [facility] encourages you to create one.  For more information call:  [phone]), [phone] ([phone]),  or log on to [email or web address].        Smoking Cessation     If you would like to quit smoking:   You may be eligible for free services if you are a [place] resident and started smoking cigarettes before September 1, 1988.  Call the Smoking Cessation [employer] ([facility]) toll free at (734) 317-5093 or [phone]) 746-0538.   Call 0-578-QUIT-NOW if you do not meet the above criteria.   Contact us via email: [email or web address]   View our website for more information: [email or web address]        Language Assistance Services     ATTENTION: Language assistance services are available, free of charge. Please call [phone].      ATENCIÓN: Si habla español, tiene a madrid disposición servicios gratuitos " de asistencia lingüística. Yolanda vieira [phone].     AURA Ý: N?u b?n nói Ti?ng Vi?t, có các d?ch v? h? tr? ngôn ng? mi?n phí dành cho b?n. G?i s? [phone].         [facility] complies with applicable Federal civil rights laws and does not discriminate on the basis of race, color, national origin, age, disability, or sex.

## 2017-04-20 NOTE — DISCHARGE INSTRUCTIONS
PRE-ADMIT TESTING -  [phone]    [address]  [facility]        OUTPATIENT SURGERY UNIT - [phone]    Your surgery has been scheduled at [facility]. We are pleased to have the opportunity to serve you. For Further Information please call [phone].    On the day of surgery please report to the Information Desk on the 1st floor.    CONTACT YOUR PHYSICIAN'S OFFICE THE DAY PRIOR TO YOUR SURGERY TO OBTAIN YOUR ARRIVAL TIME.     The evening before surgery do not eat anything after 9 p.m. ( this includes hard candy, chewing gum and mints).  You may have GATORADE, POWERADE AND WATER FROM 9 p.m. until leaving home to come to the hospital.   DO NOT DRINK ANY LIQUIDS ON THE WAY TO THE HOSPITAL.     SPECIAL MEDICATION INSTRUCTIONS: TAKE medications checked off by the Anesthesiologist on your Medication List.    Angiogram Patients: Take medications as instructed by your physician, including aspirin.     Surgery Patients:    If you take ASPIRIN - Your PHYSICIAN/SURGEON will need to inform you IF/OR when you need to stop taking aspirin prior to your surgery.     Do Not take any medications containing IBUPROFEN.  Do Not Wear any make-up or dark nail polish   (especially eye make-up) to surgery. If you come to surgery with makeup on you will be required to remove the makeup or nail polish.    Do not shave your surgical area at least 5 days prior to your surgery. The surgical prep will be performed at the hospital according to Infection Control regulations.    Leave all valuables at home.   Do Not wear any jewelry or watches, including any metal in body piercings.  Contact Lens must be removed before surgery. Either do not wear the contact lens or bring a case and solution for storage.  Please bring a container for eyeglasses or dentures as required.  Bring any paperwork your physician has provided, such as consent forms,  history and physicals, doctor's orders, etc.   Bring comfortable  clothes that are loose fitting to wear upon discharge. Take into consideration the type of surgery being performed.  Maintain your diet as advised per your physician the day prior to surgery.      Adequate rest the night before surgery is advised.   Park in the Parking lot behind the hospital or in the [place] [facility] across the street from the parking lot. Parking is complimentary.  If you will be discharged the same day as your procedure, please arrange for a responsible adult to drive you home or to accompany you if traveling by taxi.   YOU WILL NOT BE PERMITTED TO DRIVE OR TO LEAVE THE HOSPITAL ALONE AFTER SURGERY.   It is strongly recommended that you arrange for someone to remain with you for the first 24 hrs following your surgery.       Thank you for your cooperation.  The Staff of [facility].        Bathing Instructions                                                                 Please shower the evening before and morning of your procedure with    ANTIBACTERIAL SOAP. ( DIAL, etc )  Concentrate on the surgical area   for at least 3 minutes and rinse completely. Dry off as usual.   Do not use any deodorant, powder, body lotions, perfume, after shave or    cologne.

## 2017-04-21 ENCOUNTER — SURGERY (OUTPATIENT)
Age: 82
End: 2017-04-21

## 2017-04-21 ENCOUNTER — HOSPITAL ENCOUNTER (OUTPATIENT)
Facility: OTHER | Age: 82
Discharge: HOME | End: 2017-04-21
Attending: INTERNAL MEDICINE | Admitting: INTERNAL MEDICINE
Payer: MEDICARE

## 2017-04-21 VITALS
HEIGHT: 70 IN | DIASTOLIC BLOOD PRESSURE: 60 MMHG | TEMPERATURE: 99 F | RESPIRATION RATE: 18 BRPM | OXYGEN SATURATION: 97 % | HEART RATE: 56 BPM | SYSTOLIC BLOOD PRESSURE: 137 MMHG | WEIGHT: 220 LBS | BODY MASS INDEX: 31.5 KG/M2

## 2017-04-21 DIAGNOSIS — I10 ESSENTIAL HYPERTENSION: ICD-10-CM

## 2017-04-21 DIAGNOSIS — M19.019 ACROMIOCLAVICULAR JOINT ARTHRITIS: ICD-10-CM

## 2017-04-21 DIAGNOSIS — M51.36 DDD (DEGENERATIVE DISC DISEASE), LUMBAR: ICD-10-CM

## 2017-04-21 DIAGNOSIS — I73.9 PAD (PERIPHERAL ARTERY DISEASE): ICD-10-CM

## 2017-04-21 DIAGNOSIS — M25.512 ACUTE PAIN OF LEFT SHOULDER: ICD-10-CM

## 2017-04-21 DIAGNOSIS — Z87.891 EX-SMOKER: ICD-10-CM

## 2017-04-21 DIAGNOSIS — M51.16 LUMBAR DISC HERNIATION WITH RADICULOPATHY: ICD-10-CM

## 2017-04-21 DIAGNOSIS — R06.02 SOB (SHORTNESS OF BREATH): ICD-10-CM

## 2017-04-21 DIAGNOSIS — M47.816 FACET ARTHRITIS OF LUMBAR REGION: ICD-10-CM

## 2017-04-21 DIAGNOSIS — D50.0 IRON DEFICIENCY ANEMIA DUE TO CHRONIC BLOOD LOSS: ICD-10-CM

## 2017-04-21 DIAGNOSIS — I73.9 PERIPHERAL VASCULAR DISEASE, UNSPECIFIED: ICD-10-CM

## 2017-04-21 DIAGNOSIS — I25.10 CORONARY ARTERY DISEASE INVOLVING NATIVE CORONARY ARTERY OF NATIVE HEART WITHOUT ANGINA PECTORIS: Primary | ICD-10-CM

## 2017-04-21 DIAGNOSIS — M75.122 COMPLETE TEAR OF LEFT ROTATOR CUFF: ICD-10-CM

## 2017-04-21 LAB — PERIPHERAL STENOSIS: ABNORMAL

## 2017-04-21 PROCEDURE — 25500020 PHARM REV CODE 255

## 2017-04-21 PROCEDURE — 25000003 PHARM REV CODE 250: Performed by: INTERNAL MEDICINE

## 2017-04-21 PROCEDURE — C1769 GUIDE WIRE: HCPCS

## 2017-04-21 PROCEDURE — 63600175 PHARM REV CODE 636 W HCPCS

## 2017-04-21 PROCEDURE — 25000003 PHARM REV CODE 250

## 2017-04-21 RX ORDER — MAG HYDROX/ALUMINUM HYD/SIMETH 200-200-20
30 SUSPENSION, ORAL (FINAL DOSE FORM) ORAL
Status: DISCONTINUED | OUTPATIENT
Start: 2017-04-21 | End: 2017-04-22 | Stop reason: HOSPADM

## 2017-04-21 RX ORDER — SODIUM CHLORIDE 9 MG/ML
50 INJECTION, SOLUTION INTRAVENOUS CONTINUOUS
Status: DISPENSED | OUTPATIENT
Start: 2017-04-21 | End: 2017-04-21

## 2017-04-21 RX ORDER — DIAZEPAM 5 MG/1
5 TABLET ORAL
Status: COMPLETED | OUTPATIENT
Start: 2017-04-21 | End: 2017-04-21

## 2017-04-21 RX ORDER — DIPHENHYDRAMINE HYDROCHLORIDE 50 MG/ML
25 INJECTION INTRAMUSCULAR; INTRAVENOUS EVERY 6 HOURS PRN
Status: DISCONTINUED | OUTPATIENT
Start: 2017-04-21 | End: 2017-04-22 | Stop reason: HOSPADM

## 2017-04-21 RX ORDER — DIPHENHYDRAMINE HCL 25 MG
25 CAPSULE ORAL
Status: ACTIVE | OUTPATIENT
Start: 2017-04-21 | End: 2017-04-21

## 2017-04-21 RX ORDER — HYDROCODONE BITARTRATE AND ACETAMINOPHEN 10; 325 MG/1; MG/1
1 TABLET ORAL EVERY 4 HOURS PRN
Status: DISCONTINUED | OUTPATIENT
Start: 2017-04-21 | End: 2017-04-22 | Stop reason: HOSPADM

## 2017-04-21 RX ORDER — NITROGLYCERIN 0.4 MG/1
0.4 TABLET SUBLINGUAL EVERY 5 MIN PRN
Status: DISCONTINUED | OUTPATIENT
Start: 2017-04-21 | End: 2017-04-22 | Stop reason: HOSPADM

## 2017-04-21 RX ORDER — ACETAMINOPHEN 325 MG/1
650 TABLET ORAL EVERY 4 HOURS PRN
Status: DISCONTINUED | OUTPATIENT
Start: 2017-04-21 | End: 2017-04-22 | Stop reason: HOSPADM

## 2017-04-21 RX ORDER — HYDROCODONE BITARTRATE AND ACETAMINOPHEN 5; 325 MG/1; MG/1
1 TABLET ORAL EVERY 4 HOURS PRN
Status: DISCONTINUED | OUTPATIENT
Start: 2017-04-21 | End: 2017-04-22 | Stop reason: HOSPADM

## 2017-04-21 RX ORDER — ONDANSETRON 2 MG/ML
4 INJECTION INTRAMUSCULAR; INTRAVENOUS EVERY 12 HOURS PRN
Status: DISCONTINUED | OUTPATIENT
Start: 2017-04-21 | End: 2017-04-22 | Stop reason: HOSPADM

## 2017-04-21 RX ADMIN — DIAZEPAM 5 MG: 5 TABLET ORAL at 07:04

## 2017-04-21 NOTE — DISCHARGE INSTRUCTIONS
Instructions Upon Discharge for Patient Following Cardiac Cath  1. Activity   Following a period of 6 Hrs of bedrest in the hospital, you may be discharged by your physician. It is not advisable to drive your self home.    After returning home, you should confine your activities to merely sitting up at mealtime or getting up to use the bathroom, until the following morning.     2. Dressing   When you are sent home from the hospital, you will have a firm dressing on your groin. This should remain dry and intact until the morning after the procedure. If you experience a sensation of warmth, wetness or have blood oozing from the area of the dressing, remove the dressing and apply firm pressure one inch above the entry site.  This pressure should be held continually for 10 to 15 minutes while you have someone call your physician.    Providing you have not experienced any of the complications listed above, the dressing may be removed on the morning after the procedure.  This can best be accomplished by saturating the dressing in the bathtub or shower.  After removing the dressing, a Band-aid can be place at the site.    3. Pain   If you should have any pain in your foot or leg, any swelling in the groin or any abdominal pain, you should contact your physician immediately.    4. Comments    No driving, operating machinery, signing legal documents or drinking alcohol for 24 hours.      5. Emergency   If you are unable to contact your physician, please report to the nearest Emergency Room.

## 2017-04-21 NOTE — IP AVS SNAPSHOT
[facility] ([facility])  [address] [place], [place] [id number]  Phone: [phone]           Patient Discharge Instructions   Our goal is to set you up for success. This packet includes information on your condition, medications, and your home care.  It will help you care for yourself to prevent having to return to the hospital.     Please ask your nurse if you have any questions.      There are many details to remember when preparing to leave the hospital. Here is what you will need to do:    1. Take your medicine. If you are prescribed medications, review your Medication List on the following pages. You may have new medications to  at the pharmacy and others that you'll need to stop taking. Review the instructions for how and when to take your medications. Talk with your doctor or nurses if you are unsure of what to do.     2. Go to your follow-up appointments. Specific follow-up information is listed in the following pages. Your may be contacted by a nurse or clinical provider about future appointments. Be sure we have all of the phone numbers to reach you. Please contact your provider's office if you are unable to make an appointment.     3. Watch for warning signs. Your doctor or nurse will give you detailed warning signs to watch for and when to call for assistance. These instructions may also include educational information about your condition. If you experience any of warning signs to your health, call your doctor.           [facility]  Unless otherwise directed by your provider, please   contact [facility], our nurse care line   that is available for 24/7 assistance.     [phone] (toll-free)     Registered nurses in the [facility]   provide: appointment scheduling, clinical advisement, health education, and other advisory services.                  ** Verify the list of medication(s) below is accurate and up to date. Carry this with you in case of  emergency. If your medications have changed, please notify your healthcare provider.             Medication List      CONTINUE taking these medications        Additional Info                      amlodipine 5 MG tablet   Commonly known as:  NORVASC   Refills:  0   Dose:  5 mg    Instructions:  Take 5 mg by mouth once daily.     Begin Date    AM    Noon    PM    Bedtime       aspirin 81 MG EC tablet   Commonly known as:  ECOTRIN   Refills:  0   Dose:  81 mg    Instructions:  Take 81 mg by mouth once daily.     Begin Date    AM    Noon    PM    Bedtime       clopidogrel 75 mg tablet   Commonly known as:  PLAVIX   Refills:  3      Begin Date    AM    Noon    PM    Bedtime       doxazosin 8 mg 24 hr tablet   Commonly known as:  CARDURA XL   Refills:  0   Dose:  8 mg    Instructions:  Take 8 mg by mouth daily with breakfast.     Begin Date    AM    Noon    PM    Bedtime       etodolac 400 MG tablet   Commonly known as:  LODINE   Refills:  0   Dose:  400 mg    Instructions:  Take 400 mg by mouth 2 (two) times daily.     Begin Date    AM    Noon    PM    Bedtime       ferrous sulfate 325 mg (65 mg iron) Tab tablet   Refills:  0   Dose:  325 mg    Instructions:  Take 325 mg by mouth daily with breakfast.     Begin Date    AM    Noon    PM    Bedtime       finasteride 5 mg tablet   Commonly known as:  PROSCAR   Refills:  0   Dose:  5 mg    Instructions:  Take 5 mg by mouth once daily.     Begin Date    AM    Noon    PM    Bedtime       lovastatin 40 MG tablet   Commonly known as:  MEVACOR   Refills:  0   Dose:  40 mg    Instructions:  Take 40 mg by mouth every evening.     Begin Date    AM    Noon    PM    Bedtime       predniSONE 5 MG tablet   Commonly known as:  DELTASONE   Refills:  0   Dose:  5 mg    Instructions:  Take 5 mg by mouth once daily.     Begin Date    AM    Noon    PM    Bedtime       saw palmetto 500 MG capsule   Refills:  0   Dose:  500 mg    Instructions:  Take 500 mg by mouth 2 (two) times daily.      Begin Date    AM    Noon    PM    Bedtime                  Please bring to all follow up appointments:    1. A copy of your discharge instructions.  2. All medicines you are currently taking in their original bottles.  3. Identification and insurance card.    Please arrive 15 minutes ahead of scheduled appointment time.    Please call 24 hours in advance if you must reschedule your appointment and/or time.        Follow-up Information     Follow up with Herbie Soriano MD In 2 weeks.    Specialty:  Cardiology    Contact information:    Elia HERNANDEZ  [facility] [id number] [phone]          Discharge Instructions     Future Orders    Activity as tolerated     Comments:    Minimal Ambulation for next 24 hours.    Call MD for:  difficulty breathing, headache or visual disturbances     Call MD for:  redness, tenderness, or signs of infection (pain, swelling, redness, odor or green/yellow discharge around incision site)     Call MD for:  severe uncontrolled pain     Call MD for:  temperature >100.4     Call MD for:     Comments:    Bleeding and oozing at site.    Diet general     Questions:    Total calories:      Fat restriction, if any:      Protein restriction, if any:      Na restriction, if any:      Fluid restriction:      Additional restrictions:      Other restrictions (specify):     Scheduling Instructions:    Avoid strenuous activity.    Shower on day dressing removed (No bath)     Scheduling Instructions:    Remove dressing in am.        Discharge Instructions        Instructions Upon Discharge for Patient Following Cardiac Cath  1. Activity   Following a period of 6 Hrs of bedrest in the hospital, you may be discharged by your physician. It is not advisable to drive your self home.    After returning home, you should confine your activities to merely sitting up at mealtime or getting up to use the bathroom, until the following morning.     2. Dressing   When you are sent home from the hospital,  "you will have a firm dressing on your groin. This should remain dry and intact until the morning after the procedure. If you experience a sensation of warmth, wetness or have blood oozing from the area of the dressing, remove the dressing and apply firm pressure one inch above the entry site.  This pressure should be held continually for 10 to 15 minutes while you have someone call your physician.    Providing you have not experienced any of the complications listed above, the dressing may be removed on the morning after the procedure.  This can best be accomplished by saturating the dressing in the bathtub or shower.  After removing the dressing, a Band-aid can be place at the site.    3. Pain   If you should have any pain in your foot or leg, any swelling in the groin or any abdominal pain, you should contact your physician immediately.    4. Comments    No driving, operating machinery, signing legal documents or drinking alcohol for 24 hours.      5. Emergency   If you are unable to contact your physician, please report to the nearest Emergency Room.          Primary Diagnosis     Your primary diagnosis was:  Decreased Circulation      Admission Information     Date & Time Provider Department CSN    4/21/2017  5:46 AM Herbie Soriano MD [facility] [id number]      Care Providers     Provider Role Specialty Primary office phone    Herbie Soriano MD Attending Provider Cardiology [phone]    Herbie Soriano MD Surgeon  Cardiology [phone]      Your Vitals Were     BP Pulse Temp Resp Height Weight    154/74 (BP Location: Left arm, Patient Position: Lying, BP Method: Automatic) 51 98.6 °F (37 °C) (Oral) 16 5' 10" (1.778 m) 99.8 kg (220 lb)    SpO2 BMI             100% 31.57 kg/m2         Recent Lab Values     No lab values to display.      Allergies as of 4/21/2017        Reactions    Ciprofloxacin Nausea And Vomiting    Fever, chills, diarrhea    Benadryl [Diphenhydramine Hcl] " Other (See Comments)    Unable to urinate      Advance Directives     An advance directive is a document which, in the event you are no longer able to make decisions for yourself, tells your healthcare team what kind of treatment you do or do not want to receive, or who you would like to make those decisions for you.  If you do not currently have an advance directive, [facility] encourages you to create one.  For more information call:  [phone] ([phone]), [phone] ([phone]),  or log on to [email or web address].        Smoking Cessation     If you would like to quit smoking:   You may be eligible for free services if you are a [place] resident and started smoking cigarettes before September 1, 1988.  Call the [employer]oking [employer]essation [employer] ([employer]) toll free at (049) 139-8234 or (504) 919-6213.   Call 7-197-QUIT-NOW if you do not meet the above criteria.   Contact us via email: [email or web address]   View our website for more information: [email or web address]        Language Assistance Services     ATTENTION: Language assistance services are available, free of charge. Please call [phone].      ATENCIÓN: Si habla eneañol, tiene a madrid disposición servicios gratuitos de asistencia lingüística. Llame al [phone].     CHÚ Ý: N?u b?n nói Ti?ng Vi?t, có các d?ch v? h? tr? ngôn ng? mi?n phí dành cho b?n. G?i s? [phone].         [facility] complies with applicable Federal civil rights laws and does not discriminate on the basis of race, color, national origin, age, disability, or sex.

## 2017-04-21 NOTE — INTERVAL H&P NOTE
The patient has been examined and the H&P has been reviewed:    I concur with the findings and no changes have occurred since H&P was written.    Anesthesia/Surgery risks, benefits and alternative options discussed and understood by patient/family.          Active Hospital Problems    Diagnosis  POA    Peripheral vascular disease, unspecified [I73.9]  Yes      Resolved Hospital Problems    Diagnosis Date Resolved POA   No resolved problems to display.

## 2017-04-21 NOTE — DISCHARGE SUMMARY
HISTORY OF PRESENT ILLNESS:  Mr. Pak is an 86-year-old gentleman, who   complains of pains in both legs, in the thighs, in the calves and the hips and   the buttocks to the point where he says he is extremely limited in his walking   capacity.  He has had epidural steroid injections in the back, and these have   not resulted in any improvement.  Arterial Doppler studies were performed, and   these suggested high-velocity signals in the right common femoral, in the left   SFA.  He was admitted for aortofemoral angiography via the left groin.  At   angiography, he was noted to have a patent right common iliac and external iliac   stents.  The SFA had diffuse luminal irregularity and some calcification.  The   right side showed a single-vessel peroneal runoff to the foot, the anterior   tibial artery in its mid and distal segment, had high-grade tandem stenoses.    The posterior tibial on the right side was totally occluded.  On the left side,   there was a similar disease in the SFA, mild luminal irregularity.  There was   total occlusion of the posterior tibial and the distal peroneal artery on the   left side, there was a single vessel anterior tibial runoff to the foot.  In   summary, there was a single-vessel runoff bilaterally, peroneal artery on the   right side, the anterior tibial artery on the left side, the other 2   infrapopliteal vessels being occluded.  I have felt that there was merely   infrapopliteal disease of any consequence, and that one could not correlate the   severity of symptoms with the disease.  The infrapopliteal disease could   possibly a calf claudication, however, the pains in the thighs and the buttocks   upon walking are unequivocally due to lumbar spine disease.  I have suggested to   him that we may use a cilostazol (Pletal) in future, I have asked him to see me   back in the office in 2 weeks.  After the procedure, a Mynx closure was   achieved satisfactorily.  Adequate  hemostasis was achieved.  He was ambulated 4   hours after the procedure, and discharged for outpatient followup in a stable   and satisfactory condition.      OMAR/  dd: 04/21/2017 09:47:22 (CDT)  td: 04/21/2017 11:09:44 (CDT)  Doc ID   #[id number]  Job ID #[id number]    CC:

## 2017-04-21 NOTE — PLAN OF CARE
Patient prefers to have estranged wife (theya re , but ) Taz Dow present for discharge teaching. Please contact them @[phone].

## 2017-04-21 NOTE — PLAN OF CARE
Jem ARCELIA Pak has met all discharge criteria from Phase II. Vital Signs are stable, ambulating  without difficulty. Discharge instructions given, patient verbalized understanding. Discharged from facility via wheelchair in stable condition.

## 2017-04-21 NOTE — H&P (VIEW-ONLY)
Subjective:    Patient ID:  Jem Pak is a 86 y.o. male     HPI  Here for F/U of PAD, CAD.    I can't do any walking now, I get pains in both legs. More in calves, also in buttocks.  The ESIs in the back have not worked.    Current Outpatient Prescriptions   Medication Sig    amlodipine (NORVASC) 5 MG tablet Take 5 mg by mouth once daily.    aspirin (ECOTRIN) 81 MG EC tablet Take 81 mg by mouth once daily.    clopidogrel (PLAVIX) 75 mg tablet     doxazosin (CARDURA XL) 8 mg 24 hr tablet Take 8 mg by mouth daily with breakfast.    etodolac (LODINE) 400 MG tablet Take 400 mg by mouth 2 (two) times daily.    finasteride (PROSCAR) 5 mg tablet Take 5 mg by mouth once daily.    lovastatin (MEVACOR) 40 MG tablet Take 40 mg by mouth every evening.    predniSONE (DELTASONE) 5 MG tablet Take 5 mg by mouth once daily.    saw palmetto 500 MG capsule Take 500 mg by mouth 2 (two) times daily.     No current facility-administered medications for this visit.            Review of Systems   Constitution: Negative for chills, decreased appetite, fever, weight gain and weight loss.   HENT: Negative for congestion, headaches, hearing loss and sore throat.    Eyes: Negative for blurred vision, double vision and visual disturbance.   Cardiovascular: Positive for claudication. Negative for chest pain, dyspnea on exertion, leg swelling, palpitations and syncope.   Respiratory: Negative for cough, hemoptysis, shortness of breath, sputum production and wheezing.    Endocrine: Negative for cold intolerance and heat intolerance.   Hematologic/Lymphatic: Negative for bleeding problem. Does not bruise/bleed easily.   Skin: Negative for color change, dry skin, flushing and itching.   Musculoskeletal: Positive for back pain. Negative for joint pain and myalgias.   Gastrointestinal: Negative for abdominal pain, anorexia, constipation, diarrhea, dysphagia, nausea and vomiting.        No bleeding per rectum   Genitourinary: Negative for  "dysuria, flank pain, frequency, hematuria and nocturia.   Neurological: Negative for dizziness, light-headedness, loss of balance, seizures and tremors.   Psychiatric/Behavioral: Negative for altered mental status and depression.         Vitals:    04/10/17 1204   BP: (!) 140/78   Pulse: 77   Weight: 98.4 kg (217 lb)   Height: 5' 10" (1.778 m)     Objective:    Physical Exam   Constitutional: He is oriented to person, place, and time. He appears well-developed and well-nourished.   HENT:   Head: Normocephalic and atraumatic.   Right Ear: External ear normal.   Left Ear: External ear normal.   Nose: Nose normal.   Eyes: Conjunctivae and EOM are normal. Pupils are equal, round, and reactive to light. No scleral icterus.   Neck: Normal range of motion. Neck supple. No JVD present. No tracheal deviation present. No thyromegaly present.   Cardiovascular: Normal rate, regular rhythm and normal heart sounds.  Exam reveals no gallop and no friction rub.    No murmur heard.  Pulmonary/Chest: Effort normal and breath sounds normal. No respiratory distress. He has no rales. He exhibits no tenderness.   Abdominal: Soft. Bowel sounds are normal. He exhibits no distension and no mass. There is no tenderness.   Musculoskeletal: Normal range of motion. He exhibits no edema or tenderness.   Lymphadenopathy:     He has no cervical adenopathy.   Neurological: He is alert and oriented to person, place, and time. He has normal reflexes. No cranial nerve deficit. Coordination normal.   Skin: Skin is warm and dry. No rash noted.   Psychiatric: He has a normal mood and affect. His behavior is normal.       Reviewed the arterial dopplers.    Assessment:       1. PAD (peripheral artery disease)    2. Coronary artery disease involving native coronary artery of native heart without angina pectoris    3. Essential hypertension    4. Lumbar disc herniation with radiculopathy    5. Ex-smoker         Plan:     Discussed the findings and our " options with the patient.   Plan: Aortofemoral angio via the left groin. (High velocity signal in right common femoral and proximal left SFA.)

## 2017-05-01 ENCOUNTER — TELEPHONE (OUTPATIENT)
Dept: PAIN MEDICINE | Facility: CLINIC | Age: 82
End: 2017-05-01

## 2017-05-01 NOTE — TELEPHONE ENCOUNTER
Returned pt call, pt stated that he already spoke with someone regarding his plavix          ----- Message from Edilia Ahuja sent at 5/1/2017  8:35 AM CDT -----  Contact: TERRIE ESPINOSA [[id number]]  x_  1st Request  _  2nd Request  _  3rd Request        Who: TERRIE ESPINOSA [[id number]]    Why: Patient states he would like a call back in regards to Rx (PLAVIX) 75 mg tablet. Thanks.     What Number to Call Back: [phone]    When to Expect a call back: (Before the end of the day)   -- if call after 3:00 call back will be tomorrow.

## 2017-05-10 ENCOUNTER — HOSPITAL ENCOUNTER (OUTPATIENT)
Facility: OTHER | Age: 82
Discharge: HOME | End: 2017-05-10
Attending: ANESTHESIOLOGY | Admitting: ANESTHESIOLOGY
Payer: MEDICARE

## 2017-05-10 ENCOUNTER — SURGERY (OUTPATIENT)
Age: 82
End: 2017-05-10

## 2017-05-10 VITALS
HEART RATE: 66 BPM | OXYGEN SATURATION: 96 % | BODY MASS INDEX: 30.06 KG/M2 | WEIGHT: 210 LBS | TEMPERATURE: 98 F | RESPIRATION RATE: 18 BRPM | HEIGHT: 70 IN | DIASTOLIC BLOOD PRESSURE: 69 MMHG | SYSTOLIC BLOOD PRESSURE: 145 MMHG

## 2017-05-10 DIAGNOSIS — M51.16 LUMBAR DISC HERNIATION WITH RADICULOPATHY: Primary | ICD-10-CM

## 2017-05-10 PROCEDURE — 25500020 PHARM REV CODE 255: Performed by: ANESTHESIOLOGY

## 2017-05-10 PROCEDURE — 99152 MOD SED SAME PHYS/QHP 5/>YRS: CPT | Mod: ,,, | Performed by: ANESTHESIOLOGY

## 2017-05-10 PROCEDURE — 63600175 PHARM REV CODE 636 W HCPCS: Performed by: ANESTHESIOLOGY

## 2017-05-10 PROCEDURE — 62323 NJX INTERLAMINAR LMBR/SAC: CPT | Mod: ,,, | Performed by: ANESTHESIOLOGY

## 2017-05-10 PROCEDURE — 25000003 PHARM REV CODE 250: Performed by: ANESTHESIOLOGY

## 2017-05-10 PROCEDURE — 62323 NJX INTERLAMINAR LMBR/SAC: CPT | Performed by: ANESTHESIOLOGY

## 2017-05-10 PROCEDURE — 62322 NJX INTERLAMINAR LMBR/SAC: CPT | Performed by: ANESTHESIOLOGY

## 2017-05-10 PROCEDURE — 77003 FLUOROGUIDE FOR SPINE INJECT: CPT | Performed by: ANESTHESIOLOGY

## 2017-05-10 RX ORDER — LIDOCAINE HYDROCHLORIDE 10 MG/ML
10 INJECTION INFILTRATION; PERINEURAL
Status: COMPLETED | OUTPATIENT
Start: 2017-05-10 | End: 2017-05-10

## 2017-05-10 RX ORDER — MIDAZOLAM HYDROCHLORIDE 1 MG/ML
INJECTION INTRAMUSCULAR; INTRAVENOUS
Status: DISCONTINUED | OUTPATIENT
Start: 2017-05-10 | End: 2017-05-10 | Stop reason: HOSPADM

## 2017-05-10 RX ORDER — METHYLPREDNISOLONE ACETATE 40 MG/ML
40 INJECTION, SUSPENSION INTRA-ARTICULAR; INTRALESIONAL; INTRAMUSCULAR; SOFT TISSUE ONCE
Status: COMPLETED | OUTPATIENT
Start: 2017-05-10 | End: 2017-05-10

## 2017-05-10 RX ORDER — MIDAZOLAM HYDROCHLORIDE 1 MG/ML
2 INJECTION INTRAMUSCULAR; INTRAVENOUS
Status: DISCONTINUED | OUTPATIENT
Start: 2017-05-10 | End: 2017-05-10 | Stop reason: HOSPADM

## 2017-05-10 RX ORDER — BUPIVACAINE HYDROCHLORIDE 2.5 MG/ML
10 INJECTION, SOLUTION EPIDURAL; INFILTRATION; INTRACAUDAL ONCE
Status: COMPLETED | OUTPATIENT
Start: 2017-05-10 | End: 2017-05-10

## 2017-05-10 RX ORDER — SODIUM CHLORIDE 9 MG/ML
INJECTION, SOLUTION INTRAVENOUS CONTINUOUS
Status: DISCONTINUED | OUTPATIENT
Start: 2017-05-10 | End: 2017-05-10 | Stop reason: HOSPADM

## 2017-05-10 RX ORDER — SODIUM CHLORIDE 9 MG/ML
3 INJECTION, SOLUTION INTRAMUSCULAR; INTRAVENOUS; SUBCUTANEOUS ONCE
Status: COMPLETED | OUTPATIENT
Start: 2017-05-10 | End: 2017-05-10

## 2017-05-10 RX ADMIN — MIDAZOLAM HYDROCHLORIDE 1 MG: 1 INJECTION, SOLUTION INTRAMUSCULAR; INTRAVENOUS at 11:05

## 2017-05-10 RX ADMIN — IOHEXOL 5 ML: 300 INJECTION, SOLUTION INTRAVENOUS at 11:05

## 2017-05-10 RX ADMIN — BUPIVACAINE HYDROCHLORIDE 10 ML: 2.5 INJECTION, SOLUTION EPIDURAL; INFILTRATION; INTRACAUDAL; PERINEURAL at 11:05

## 2017-05-10 RX ADMIN — METHYLPREDNISOLONE ACETATE 80 MG: 40 INJECTION, SUSPENSION INTRA-ARTICULAR; INTRALESIONAL; INTRAMUSCULAR; SOFT TISSUE at 11:05

## 2017-05-10 RX ADMIN — SODIUM CHLORIDE 3 ML: 9 INJECTION, SOLUTION INTRAMUSCULAR; INTRAVENOUS; SUBCUTANEOUS at 11:05

## 2017-05-10 RX ADMIN — LIDOCAINE HYDROCHLORIDE 10 ML: 10 INJECTION, SOLUTION INFILTRATION; PERINEURAL at 11:05

## 2017-05-10 NOTE — DISCHARGE INSTRUCTIONS

## 2017-05-10 NOTE — IP AVS SNAPSHOT
[facility] Location ([facility])  [address] [place], [place] [address]  Phone: [phone]           Patient Discharge Instructions   Our goal is to set you up for success. This packet includes information on your condition, medications, and your home care.  It will help you care for yourself to prevent having to return to the hospital.     Please ask your nurse if you have any questions.      There are many details to remember when preparing to leave the hospital. Here is what you will need to do:    1. Take your medicine. If you are prescribed medications, review your Medication List on the following pages. You may have new medications to  at the pharmacy and others that you'll need to stop taking. Review the instructions for how and when to take your medications. Talk with your doctor or nurses if you are unsure of what to do.     2. Go to your follow-up appointments. Specific follow-up information is listed in the following pages. Your may be contacted by a nurse or clinical provider about future appointments. Be sure we have all of the phone numbers to reach you. Please contact your provider's office if you are unable to make an appointment.     3. Watch for warning signs. Your doctor or nurse will give you detailed warning signs to watch for and when to call for assistance. These instructions may also include educational information about your condition. If you experience any of warning signs to your health, call your doctor.           [facility]  Unless otherwise directed by your provider, please   contact [facility], our nurse care line   that is available for 24/7 assistance.     [phone] (toll-free)     Registered nurses in the [facility]   provide: appointment scheduling, clinical advisement, health education, and other advisory services.                  ** Verify the list of medication(s) below is accurate and up to date. Carry this with you in case of  emergency. If your medications have changed, please notify your healthcare provider.             Medication List      CONTINUE taking these medications        Additional Info                      amlodipine 5 MG tablet   Commonly known as:  NORVASC   Refills:  0   Dose:  5 mg    Instructions:  Take 5 mg by mouth once daily.     Begin Date    AM    Noon    PM    Bedtime       aspirin 81 MG EC tablet   Commonly known as:  ECOTRIN   Refills:  0   Dose:  81 mg    Instructions:  Take 81 mg by mouth once daily.     Begin Date    AM    Noon    PM    Bedtime       clopidogrel 75 mg tablet   Commonly known as:  PLAVIX   Refills:  3      Begin Date    AM    Noon    PM    Bedtime       doxazosin 8 mg 24 hr tablet   Commonly known as:  CARDURA XL   Refills:  0   Dose:  8 mg    Instructions:  Take 8 mg by mouth daily with breakfast.     Begin Date    AM    Noon    PM    Bedtime       etodolac 400 MG tablet   Commonly known as:  LODINE   Refills:  0   Dose:  400 mg    Instructions:  Take 400 mg by mouth 2 (two) times daily.     Begin Date    AM    Noon    PM    Bedtime       ferrous sulfate 325 mg (65 mg iron) Tab tablet   Refills:  0   Dose:  325 mg    Instructions:  Take 325 mg by mouth daily with breakfast.     Begin Date    AM    Noon    PM    Bedtime       finasteride 5 mg tablet   Commonly known as:  PROSCAR   Refills:  0   Dose:  5 mg    Instructions:  Take 5 mg by mouth once daily.     Begin Date    AM    Noon    PM    Bedtime       lovastatin 40 MG tablet   Commonly known as:  MEVACOR   Refills:  0   Dose:  40 mg    Instructions:  Take 40 mg by mouth every evening.     Begin Date    AM    Noon    PM    Bedtime       predniSONE 5 MG tablet   Commonly known as:  DELTASONE   Refills:  0   Dose:  5 mg    Instructions:  Take 5 mg by mouth once daily.     Begin Date    AM    Noon    PM    Bedtime       saw palmetto 500 MG capsule   Refills:  0   Dose:  500 mg    Instructions:  Take 500 mg by mouth 2 (two) times daily.      Begin Date    AM    Noon    PM    Bedtime                  Please bring to all follow up appointments:    1. A copy of your discharge instructions.  2. All medicines you are currently taking in their original bottles.  3. Identification and insurance card.    Please arrive 15 minutes ahead of scheduled appointment time.    Please call 24 hours in advance if you must reschedule your appointment and/or time.        Your Scheduled Appointments     May 17, 2017  1:50 PM CDT   Established Patient Visit with Herbie Soriano MD   [facility]    [address], [address]  [facility] [address]   [phone]            May 25, 2017  8:15 AM CDT   Established Patient Visit with Isaak Hudson MD   [facility] ([facility]    [address]  [place] [address]   [phone]              Your Future Surgeries/Procedures     May 10, 2017   Surgery with Isaak Hudson MD   [facility] ([facility])    [address] [facility] [address]   [phone]                  Discharge Instructions       Home Care Instructions Pain Management:    1. DIET:   You may resume your normal diet today.   2. BATHING:   You may shower with luke warm water.  3. DRESSING:   You may remove your bandage today.   4. ACTIVITY LEVEL:   You may resume your normal activities 24 hrs after your procedure.  5. MEDICATIONS:   You may resume your normal medications today.   6. SPECIAL INSTRUCTIONS:   No heat to the injection site for 24 hrs including, bath or shower, heating pad, moist heat, or hot tubs.    Use ice pack to injection site for any pain or discomfort.  Apply ice packs for 20 minute intervals as needed.   If you have received any sedatives by mouth today you may not drive for 12 hours.    If you have received any sedation through your IV, you may not drive for 24 hrs.     PLEASE CALL YOUR DOCTOR IF:  1. Redness or swelling  "around the injection site.  2. Fever of 101 degrees  3. Drainage (pus) from the injection site.  4. For any continuous bleeding (some dried blood over the incision is normal.)    FOR EMERGENCIES:   If any unusual problems or difficulties occur during clinic hours, call [phone] or [phone].         Admission Information     Date & Time Provider Department CSN    5/10/2017 10:26 AM Isaak Hudson MD [facility] [id number]      Care Providers     Provider Role Specialty Primary office phone    Isaak Hudson MD Attending Provider Pain Medicine [phone]    Isaak Hudson MD Surgeon  Pain Medicine [phone]      Your Vitals Were     BP Pulse Temp Resp Height Weight    152/68 67 97.6 °F (36.4 °C) (Oral) 18 5' 10" (1.778 m) 95.3 kg (210 lb)    SpO2 BMI             97% 30.13 kg/m2         Recent Lab Values     No lab values to display.      Allergies as of 5/10/2017        Reactions    Ciprofloxacin Nausea And Vomiting    Fever, chills, diarrhea    Benadryl [Diphenhydramine Hcl] Other (See Comments)    Unable to urinate      Advance Directives     An advance directive is a document which, in the event you are no longer able to make decisions for yourself, tells your healthcare team what kind of treatment you do or do not want to receive, or who you would like to make those decisions for you.  If you do not currently have an advance directive, [facility] encourages you to create one.  For more information call:  [phone]), [phone] ([phone]),  or log on to [email or web address].        Smoking Cessation     If you would like to quit smoking:   You may be eligible for free services if you are a [place] resident and started smoking cigarettes before September 1, 1988.  Call the Smoking Cessation [employer] (SCT) toll free at (047) 653-0410 or (665) 178-3245.   Call 1-800-QUIT-NOW if you do not meet the above criteria.   Contact us via email: " [email or web address]   View our website for more information: [email or web address]        Language Assistance Services     ATTENTION: Language assistance services are available, free of charge. Please call [phone].      ATENCIÓN: Si habla jona, tiene a madrid disposición servicios gratuitos de asistencia lingüística. Llame al [phone].     CHÚ Ý: N?u b?n nói Ti?ng Vi?t, có các d?ch v? h? tr? ngôn ng? mi?n phí dành cho b?n. G?i s? [phone].         [facility] [place] complies with applicable Federal civil rights laws and does not discriminate on the basis of race, color, national origin, age, disability, or sex.

## 2017-05-10 NOTE — DISCHARGE SUMMARY
Discharge Note  Short Stay      SUMMARY     Admit Date: 5/10/2017    Attending Physician: Isaak Hudson      Discharge Physician: Isaak Hudson      Discharge Date: 5/10/2017 11:37 AM     PROCEDURE: Interlaminar epidural steroid injection under fluoroscopic guidance.     Pre-Op diagnosis: lumbar stenosis with radiculopathy    Disposition: Home or self care    Patient Instructions:   Current Discharge Medication List      CONTINUE these medications which have NOT CHANGED    Details   amlodipine (NORVASC) 5 MG tablet Take 5 mg by mouth once daily.      aspirin (ECOTRIN) 81 MG EC tablet Take 81 mg by mouth once daily.      doxazosin (CARDURA XL) 8 mg 24 hr tablet Take 8 mg by mouth daily with breakfast.      etodolac (LODINE) 400 MG tablet Take 400 mg by mouth 2 (two) times daily.      ferrous sulfate 325 mg (65 mg iron) Tab tablet Take 325 mg by mouth daily with breakfast.      finasteride (PROSCAR) 5 mg tablet Take 5 mg by mouth once daily.      lovastatin (MEVACOR) 40 MG tablet Take 40 mg by mouth every evening.      predniSONE (DELTASONE) 5 MG tablet Take 5 mg by mouth once daily.      saw palmetto 500 MG capsule Take 500 mg by mouth 2 (two) times daily.      clopidogrel (PLAVIX) 75 mg tablet Refills: 3             Resume home diet and activity

## 2017-05-10 NOTE — OP NOTE
Lumbar Interlaminar Epidural Steroid Injection under Fluoroscopic Guidance.   Time-out taken to identify patient and procedure prior to starting the procedure.     05/10/2017    PROCEDURE: Interlaminar epidural steroid injection under fluoroscopic guidance.     Pre-Op diagnosis: lumbar stenosis with radiculopathy    Post-Op diagnosis: same    PHYSICIAN: EDWINA MATTHEW     ASSISTANTS: None     ESTIMATED BLOOD LOSS: none.     COMPLICATIONS: none.     SPECIMENS: none    TECHNIQUE: With the patient laying in a prone position, the area was prepped and draped in the usual sterile fashion using ChloraPrep and a fenestrated drape. 1% lidocaine was given using a 27-gauge needle by raising a wheal and going down to the hub of the needle over the L4/5 interlaminar space.  The interlaminar space was then approached with a 3.5 inch 18-gauge Touhy needle was introduced under fluoroscopic guidance in the AP and Lateral view. Once the Ligamentum flavum was encountered loss of resistance to saline was used to enter the epidural space. With positive loss of resistance and negative CSF or Blood, 3mL contrast dye Omnipaque (300mg/ml) was injected to confirm placement and there was no vascular runoff. Then 1ml 40mg/ml Depomedrol + 1mL 0.25% Bupivicaine + 8mL preservative free normal saline was injected slowly. Displacement of the radio opaque contrast after injection of the medication confirmed that the medication went into the area of the epidural space.  The patient tolerated the procedure well.     Conscious sedation provided by M.D    The patient was monitored with continuous pulse oximetry, EKG, and intermittent blood pressure monitors.  The patient was hemodynamically stable throughout the entire process was responsive to voice, and breathing spontaneously.  Supplemental O2 was provided at 2L/min via nasal cannula.  Patient was comfortable for the duration of the procedure.    There was a total of 1  mg IV Midazolam was titrated  for the procedure      The patient was monitored after the procedure.   They were given post-procedure and discharge instructions to follow at home.  The patient was discharged in a stable condition.

## 2017-05-10 NOTE — H&P
"HPI 86 year old male with chronic back pain is presenting for L4-5 IL LEONIDES. The patient has stopped his plavix for 7 days.         PMHx, PSHx, Allergies, Medications reviewed in epic    ROS negative except pain complaints in HPI    OBJECTIVE:    BP (!) 177/83 (BP Location: Right arm, Patient Position: Lying, BP Method: Automatic)  Pulse 74  Temp 97.6 °F (36.4 °C) (Oral)   Resp 18  Ht 5' 10" (1.778 m)  Wt 95.3 kg (210 lb)  SpO2 98%  BMI 30.13 kg/m2    PHYSICAL EXAMINATION:    GENERAL: Well appearing, in no acute distress, alert and oriented x3.  PSYCH:  Mood and affect appropriate.  SKIN: Skin color, texture, turgor normal, no rashes or lesions.  CV: RRR with palpation of the radial artery.  PULM: No evidence of respiratory difficulty, symmetric chest rise. Clear to auscultation.  NEURO: Cranial nerves grossly intact.    Plan:    Proceed with procedure as planned    Lukas Colón  05/10/2017    "

## 2017-05-25 ENCOUNTER — OFFICE VISIT (OUTPATIENT)
Dept: PAIN MEDICINE | Facility: CLINIC | Age: 82
End: 2017-05-25
Attending: ANESTHESIOLOGY
Payer: MEDICARE

## 2017-05-25 VITALS
BODY MASS INDEX: 31.22 KG/M2 | HEART RATE: 77 BPM | SYSTOLIC BLOOD PRESSURE: 168 MMHG | TEMPERATURE: 98 F | WEIGHT: 218.06 LBS | DIASTOLIC BLOOD PRESSURE: 84 MMHG | HEIGHT: 70 IN

## 2017-05-25 DIAGNOSIS — I25.10 CORONARY ARTERY DISEASE INVOLVING NATIVE CORONARY ARTERY OF NATIVE HEART WITHOUT ANGINA PECTORIS: Primary | ICD-10-CM

## 2017-05-25 DIAGNOSIS — M47.816 FACET ARTHRITIS OF LUMBAR REGION: ICD-10-CM

## 2017-05-25 DIAGNOSIS — M48.062 SPINAL STENOSIS, LUMBAR REGION, WITH NEUROGENIC CLAUDICATION: ICD-10-CM

## 2017-05-25 DIAGNOSIS — M51.16 LUMBAR DISC HERNIATION WITH RADICULOPATHY: ICD-10-CM

## 2017-05-25 PROCEDURE — 99213 OFFICE O/P EST LOW 20 MIN: CPT | Mod: S$GLB,,, | Performed by: ANESTHESIOLOGY

## 2017-05-25 PROCEDURE — 99999 PR PBB SHADOW E&M-EST. PATIENT-LVL IV: CPT | Mod: PBBFAC,,, | Performed by: ANESTHESIOLOGY

## 2017-05-25 PROCEDURE — 1159F MED LIST DOCD IN RCRD: CPT | Mod: S$GLB,,, | Performed by: ANESTHESIOLOGY

## 2017-05-25 PROCEDURE — 1125F AMNT PAIN NOTED PAIN PRSNT: CPT | Mod: S$GLB,,, | Performed by: ANESTHESIOLOGY

## 2017-05-25 NOTE — PROGRESS NOTES
Chronic Pain - follow Up    Referring Physician: No ref. provider found    Chief Complaint:   Chief Complaint   Patient presents with    Low-back Pain        SUBJECTIVE: Disclaimer: This note has been generated using voice-recognition software. There may be typographical errors that have been missed during proof-reading.  Interval History 5/25/2017  S/p L4-5 interlaminar epidural steroid injection on 5/10/2017 with significant improvement in the right lower extremity but continued pain in the left and describing symptoms of neurogenic claudication.  His last MRI was from 2009 and we have previously discussed spinal cord simulation is an option for the future for both his peripheral arterial disease and neurogenic claudication.  No other health changes since previous encounter.  Interval history 3/9/2017:  Patient is status post lumbar interlaminar epidural steroid injection at L4-5 on 2/22/2017 reports overall 50% relief of his pain he does continue to have peripheral arterial disease which is causing symptoms of vascular claudication.  No other health changes since previous encounter patient has resumed his Plavix.    Initial encounter:    Jem PANIAGUA Kenova presents to the clinic for the evaluation of low back pain. The pain started over 20 years ago following nothing in particular and symptoms have been worsening.    Brief history:  When the pain originally began he reports having an injection around 2000 at [facility].  Most recently, he was seeing Dr. Ephraim Mock and had multiple steroid injections, although he is unsure of the type.  He believes his last injection was in 2014 which provided him about 75% pain relief for about one year.    He reports a history of CAD and PAD.  He reports having a heart attack in 1992 which resulted in a stent being placed.   He is currently on Aspirin and Plavix.  He also has a h/o prostate cancer and had a prostatectomy in the past.    Pain Description:    The pain is located  in the low back area and radiates to the bilateral lower extremities at knee.      At BEST  6/10     At WORST  10/10 on the WORST day.      On average pain is rated as 5/10.     Today the pain is rated as 5/10    The pain is described as dull and throbbing      Symptoms interfere with daily activity and sleeping.     Exacerbating factors: Laying, Bending, Walking, Lifting and Getting out of bed/chair.      Mitigating factors rest, sitting and injections.     Patient denies night fever/night sweats, urinary incontinence, bowel incontinence, significant weight loss and significant motor weakness.  Patient denies any suicidal or homicidal ideations    Pain Medications:  Current:  etodolac    Tried in Past:  NSAIDs - Etodolac  TCA -Never  SNRI -Never  Anti-convulsants -Never  Muscle Relaxants -Never  Opioids-Never    Physical Therapy/Home Exercise: PT years ago with limited relief.     report:  Not applicable    Pain Procedures: Lumbar injections by Dr. Mock- last in 2014 with relief, records not available on today's visit.  Lumbar interlaminar epidural steroid injection at L4-5 2/22/2017  5/10/2017 - L4/5 ILESI    Chiropractor -never  Acupuncture - never  TENS unit -never  Spinal decompression -never  Joint replacement -never    Imaging: MRI lumbar spine 9/2009  Grade I anterolisthesis of L3 on L4.    1.3 x 1 cm right facet synovial cyst at L2/3 causing moderate to severe  spinal canal stenosis    Multilevel degenerative disc disease with moderate central canal narrowing  at L3/4 and L4/5 levels as described above.        Past Medical History:   Diagnosis Date    Anticoagulant long-term use     Arthritis     BPH (benign prostatic hyperplasia)     Chronic back pain     with injections Dr. Ephraim Hightower (Pain Management)    Coronary artery disease     Hypertension     PVD (peripheral vascular disease)     SOB (shortness of breath)      Past Surgical History:   Procedure Laterality Date    CORONARY STENT  PLACEMENT      EYE SURGERY      bilateral cataracts    KIDNEY STONE SURGERY      PROSTATECTOMY  7/2013    TURP    VASCULAR SURGERY       Social History     Social History    Marital status: Legally      Spouse name: N/A    Number of children: N/A    Years of education: N/A     Occupational History    Not on file.     Social History Main Topics    Smoking status: Former Smoker    Smokeless tobacco: Former User     Quit date: 4/20/2010      Comment: quit     Alcohol use No    Drug use: No    Sexual activity: Not on file     Other Topics Concern    Not on file     Social History Narrative    No narrative on file     History reviewed. No pertinent family history.    Review of patient's allergies indicates:   Allergen Reactions    Ciprofloxacin Nausea And Vomiting     Fever, chills, diarrhea    Benadryl [diphenhydramine hcl] Other (See Comments)     Unable to urinate       Current Outpatient Prescriptions   Medication Sig    amlodipine (NORVASC) 5 MG tablet Take 5 mg by mouth once daily.    aspirin (ECOTRIN) 81 MG EC tablet Take 81 mg by mouth once daily.    clopidogrel (PLAVIX) 75 mg tablet     doxazosin (CARDURA XL) 8 mg 24 hr tablet Take 8 mg by mouth daily with breakfast.    etodolac (LODINE) 400 MG tablet Take 400 mg by mouth 2 (two) times daily.    ferrous sulfate 325 mg (65 mg iron) Tab tablet Take 325 mg by mouth daily with breakfast.    finasteride (PROSCAR) 5 mg tablet Take 5 mg by mouth once daily.    lovastatin (MEVACOR) 40 MG tablet Take 40 mg by mouth every evening.    predniSONE (DELTASONE) 5 MG tablet Take 5 mg by mouth once daily.    saw palmetto 500 MG capsule Take 500 mg by mouth 2 (two) times daily.     No current facility-administered medications for this visit.        REVIEW OF SYSTEMS:    GENERAL:  No weight loss, malaise or fevers.  RESPIRATORY:  Negative for cough, wheezing or shortness of breath, patient denies any recent URI.  CARDIOVASCULAR:  Negative for  "chest pain, or palpitations. Leg swelling secondary to heart disease.  GI:  Negative for abdominal discomfort, blood in stools or black stools or change in bowel habits.  MUSCULOSKELETAL:  See HPI.  SKIN:  Negative for lesions, rash, and itching.  PSYCH:  No mood disorder or recent psychosocial stressors.  Patients sleep is not disturbed secondary to pain.  HEMATOLOGY/LYMPHOLOGY:  Negative for prolonged bleeding or swollen nodes. Patient reports bruising easily secondary to blood thinners. Patient is currently taking Plavix and aspiring.  ENDO: No history of diabetes or thyroid dysfunction  NEURO:   No history of headaches, syncope, paralysis, seizures or tremors.  All other reviewed and negative other than HPI.    OBJECTIVE:    BP (!) 168/84   Pulse 77   Temp 97.8 °F (36.6 °C)   Ht 5' 10" (1.778 m)   Wt 98.9 kg (218 lb 0.6 oz)   BMI 31.28 kg/m²     PHYSICAL EXAMINATION:    GENERAL: Well appearing, in no acute distress, alert and oriented x3.  PSYCH:  Mood and affect appropriate.  SKIN:  No evidence of infection from previous injection site.  HEAD/FACE:  Normocephalic, atraumatic.   CV: RRR with palpation of the radial artery.  PULM: No evidence of respiratory difficulty, symmetric chest rise.  BACK: Straight leg raising in the supine position is negative to radicular pain.  No pain to palpation over the facet joints.  Pain with lumbar extension.  Positive facet loading bilaterally.  EXTREMITIES: Lower extremity peripheral joint ROM is full and pain free without obvious instability or laxity in all extremities. Pain with palpation to posterior area of left shoulder.  Negative NEER and Huff. No deformities, edema, or skin discoloration. Good capillary refill.  Lower extremity strength equal and symmetric 5/5 with knee extension/flex.  Dorsiflexion of ankle 4/5 bilaterally.  MUSCULOSKELETAL: Shoulder, hip, and knee provocative maneuvers are negative.  There is no pain with palpation over the sacroiliac joints " bilaterally.  FABERs test is negative.  FADIRs test is negative.   Bilateral upper and lower extremity strength is normal and symmetric.  No atrophy or tone abnormalities are noted.  NEURO: Cranial nerves grossly intact.  GAIT: Antalgic, ambulates without assistance     ASSESSMENT: 86 y.o. year old male with lower back pain, consistent with lumbar radiculopathy, lumbar DDD and lumbar facet arthropathy.    1. Coronary artery disease involving native coronary artery of native heart without angina pectoris     2. Facet arthritis of lumbar region     3. Lumbar disc herniation with radiculopathy     4. Spinal stenosis, lumbar region, with neurogenic claudication  MRI Lumbar Spine Without Contrast       PLAN:   -Patient will need to hold Plavix for 7 days prior to repeat the injection  - Schedule for a Lumbar Epidural Steroid Injection at L4-5 to help with pain and progress with a Home exercise program.  -Updated MRI lumbar spine    In the future the patient may benefit from spinal cord stimulator as this may help with symptoms of neurogenic claudication and also with peripheral arterial disease.  I provided the patient with a Affib[employer] DVD to review the spinal cord stim relation in the future.    The above plan and management options were discussed at length with patient. Patient is in agreement with the above and verbalized understanding. It will be communicated with the referring physician via electronic record, fax, or mail.    Isaak Hudson  05/25/2017

## 2017-05-31 ENCOUNTER — HOSPITAL ENCOUNTER (EMERGENCY)
Facility: OTHER | Age: 82
Discharge: HOME | End: 2017-05-31
Attending: EMERGENCY MEDICINE
Payer: MEDICARE

## 2017-05-31 ENCOUNTER — TELEPHONE (OUTPATIENT)
Dept: PAIN MEDICINE | Facility: CLINIC | Age: 82
End: 2017-05-31

## 2017-05-31 VITALS
RESPIRATION RATE: 22 BRPM | BODY MASS INDEX: 30.78 KG/M2 | WEIGHT: 215 LBS | HEART RATE: 66 BPM | OXYGEN SATURATION: 98 % | HEIGHT: 70 IN | SYSTOLIC BLOOD PRESSURE: 139 MMHG | DIASTOLIC BLOOD PRESSURE: 72 MMHG | TEMPERATURE: 99 F

## 2017-05-31 DIAGNOSIS — R31.9 HEMATURIA: ICD-10-CM

## 2017-05-31 DIAGNOSIS — R11.2 NON-INTRACTABLE VOMITING WITH NAUSEA, UNSPECIFIED VOMITING TYPE: Primary | ICD-10-CM

## 2017-05-31 DIAGNOSIS — N39.0 URINARY TRACT INFECTION, ACUTE: ICD-10-CM

## 2017-05-31 DIAGNOSIS — E87.6 HYPOKALEMIA: ICD-10-CM

## 2017-05-31 DIAGNOSIS — K92.2 UPPER GI BLEED: ICD-10-CM

## 2017-05-31 DIAGNOSIS — D64.9 ANEMIA, UNSPECIFIED: ICD-10-CM

## 2017-05-31 LAB
ABO GROUP BLD: NORMAL
ALBUMIN SERPL BCP-MCNC: 3.6 G/DL
ALP SERPL-CCNC: 81 U/L
ALT SERPL W/O P-5'-P-CCNC: 25 U/L
ANION GAP SERPL CALC-SCNC: 8 MMOL/L
ANISOCYTOSIS BLD QL SMEAR: SLIGHT
AST SERPL-CCNC: 21 U/L
BACTERIA #/AREA URNS HPF: ABNORMAL /HPF
BASOPHILS # BLD AUTO: 0.01 K/UL
BASOPHILS NFR BLD: 0.1 %
BILIRUB SERPL-MCNC: 0.4 MG/DL
BILIRUB UR QL STRIP: ABNORMAL
BLD GP AB SCN CELLS X3 SERPL QL: NORMAL
BUN SERPL-MCNC: 17 MG/DL
CALCIUM SERPL-MCNC: 8.8 MG/DL
CHLORIDE SERPL-SCNC: 108 MMOL/L
CLARITY UR: CLEAR
CO2 SERPL-SCNC: 28 MMOL/L
COLOR UR: YELLOW
CREAT SERPL-MCNC: 1.1 MG/DL
DIFFERENTIAL METHOD: ABNORMAL
EOSINOPHIL # BLD AUTO: 0.1 K/UL
EOSINOPHIL NFR BLD: 0.6 %
ERYTHROCYTE [DISTWIDTH] IN BLOOD BY AUTOMATED COUNT: 20.7 %
EST. GFR  (AFRICAN AMERICAN): >60 ML/MIN/1.73 M^2
EST. GFR  (NON AFRICAN AMERICAN): >60 ML/MIN/1.73 M^2
GIANT PLATELETS BLD QL SMEAR: PRESENT
GLUCOSE SERPL-MCNC: 97 MG/DL
GLUCOSE UR QL STRIP: NEGATIVE
GRAN CASTS #/AREA URNS LPF: 1 /LPF
HCT VFR BLD AUTO: 40.3 %
HGB BLD-MCNC: 12.9 G/DL
HGB UR QL STRIP: NEGATIVE
INR PPP: 1
KETONES UR QL STRIP: NEGATIVE
LEUKOCYTE ESTERASE UR QL STRIP: ABNORMAL
LYMPHOCYTES # BLD AUTO: 0.7 K/UL
LYMPHOCYTES NFR BLD: 5.5 %
MCH RBC QN AUTO: 26.4 PG
MCHC RBC AUTO-ENTMCNC: 32 %
MCV RBC AUTO: 83 FL
MICROSCOPIC COMMENT: ABNORMAL
MONOCYTES # BLD AUTO: 0.9 K/UL
MONOCYTES NFR BLD: 6.7 %
NEUTROPHILS # BLD AUTO: 11.6 K/UL
NEUTROPHILS NFR BLD: 87.1 %
NITRITE UR QL STRIP: POSITIVE
OVALOCYTES BLD QL SMEAR: ABNORMAL
PH UR STRIP: 6 [PH] (ref 5–8)
PLATELET # BLD AUTO: 152 K/UL
PLATELET BLD QL SMEAR: ABNORMAL
PMV BLD AUTO: ABNORMAL FL
POIKILOCYTOSIS BLD QL SMEAR: SLIGHT
POLYCHROMASIA BLD QL SMEAR: ABNORMAL
POTASSIUM SERPL-SCNC: 3.2 MMOL/L
PROT SERPL-MCNC: 7 G/DL
PROT UR QL STRIP: ABNORMAL
PROTHROMBIN TIME: 10.7 SEC
RBC # BLD AUTO: 4.88 M/UL
RBC #/AREA URNS HPF: 2 /HPF (ref 0–4)
RH BLD: NORMAL
SCHISTOCYTES BLD QL SMEAR: ABNORMAL
SODIUM SERPL-SCNC: 144 MMOL/L
SP GR UR STRIP: 1.02 (ref 1–1.03)
SQUAMOUS #/AREA URNS HPF: 8 /HPF
URN SPEC COLLECT METH UR: ABNORMAL
UROBILINOGEN UR STRIP-ACNC: NEGATIVE EU/DL
WBC # BLD AUTO: 13.35 K/UL
WBC #/AREA URNS HPF: 50 /HPF (ref 0–5)
YEAST URNS QL MICRO: ABNORMAL

## 2017-05-31 PROCEDURE — 96374 THER/PROPH/DIAG INJ IV PUSH: CPT

## 2017-05-31 PROCEDURE — 86850 RBC ANTIBODY SCREEN: CPT

## 2017-05-31 PROCEDURE — 86901 BLOOD TYPING SEROLOGIC RH(D): CPT

## 2017-05-31 PROCEDURE — 80053 COMPREHEN METABOLIC PANEL: CPT

## 2017-05-31 PROCEDURE — 25000003 PHARM REV CODE 250: Performed by: EMERGENCY MEDICINE

## 2017-05-31 PROCEDURE — 93010 ELECTROCARDIOGRAM REPORT: CPT | Mod: ,,, | Performed by: INTERNAL MEDICINE

## 2017-05-31 PROCEDURE — 93005 ELECTROCARDIOGRAM TRACING: CPT

## 2017-05-31 PROCEDURE — 81000 URINALYSIS NONAUTO W/SCOPE: CPT

## 2017-05-31 PROCEDURE — 63600175 PHARM REV CODE 636 W HCPCS: Performed by: EMERGENCY MEDICINE

## 2017-05-31 PROCEDURE — 86900 BLOOD TYPING SEROLOGIC ABO: CPT

## 2017-05-31 PROCEDURE — 85025 COMPLETE CBC W/AUTO DIFF WBC: CPT

## 2017-05-31 PROCEDURE — 85610 PROTHROMBIN TIME: CPT

## 2017-05-31 PROCEDURE — 99285 EMERGENCY DEPT VISIT HI MDM: CPT | Mod: 25

## 2017-05-31 RX ORDER — ONDANSETRON 2 MG/ML
4 INJECTION INTRAMUSCULAR; INTRAVENOUS
Status: COMPLETED | OUTPATIENT
Start: 2017-05-31 | End: 2017-05-31

## 2017-05-31 RX ORDER — ONDANSETRON 4 MG/1
4 TABLET, ORALLY DISINTEGRATING ORAL EVERY 6 HOURS PRN
Qty: 12 TABLET | Refills: 0 | Status: SHIPPED | OUTPATIENT
Start: 2017-05-31 | End: 2021-05-31

## 2017-05-31 RX ADMIN — ONDANSETRON 4 MG: 2 INJECTION, SOLUTION INTRAMUSCULAR; INTRAVENOUS at 08:05

## 2017-05-31 RX ADMIN — POTASSIUM BICARBONATE 50 MEQ: 25 TABLET, EFFERVESCENT ORAL at 10:05

## 2017-05-31 NOTE — PLAN OF CARE
05/31/17 0934   ED Admissions Case Approval   ED Admissions Case Approval (!) CM Approved  (OBS )

## 2017-05-31 NOTE — ED NOTES
400cc's of sterile water inserted in 16fr NG tube for gastric lavage. Contents clear from blood. Md notified.

## 2017-05-31 NOTE — TELEPHONE ENCOUNTER
Spoke with patient and he has rescheduled his procedure on 6-6-17 to 6-27-17 qwith Dr. duarte due to being on antibiotics until 6-9-17.

## 2017-05-31 NOTE — TELEPHONE ENCOUNTER
----- Message from Emili Powell sent at 5/31/2017  3:16 PM CDT -----  _  1st Request  _  2nd Request  _  3rd Request        Who: patient    Why: pt is calling to reschedule his procedure, he started antibiotics today and will stop on 6/9/17    What Number to Call Back:[phone]    When to Expect a call back: (Before the end of the day)   -- if the call is after 12:00, the call back will be tomorrow.

## 2017-05-31 NOTE — ED NOTES
Pt reports n/v started lasrt night at approximately 0200. Reports first episode of emesis was bright red blood, reports multiple episodes, none while in hospital. Pt reports burning on urination x 1 week, denies pain currently. Pt is AAO x 3, answers questions appropriately. Fall risk band applied per protocol. Family at bedside

## 2017-05-31 NOTE — ED PROVIDER NOTES
Encounter Date: 5/31/2017    SCRIBE #1 NOTE: I, Betina Quigley, am scribing for, and in the presence of,  Dr. Kruger. I have scribed the entire note.       History     Chief Complaint   Patient presents with    Hematemesis     Pt to ED via [facility] with report of vomiting with bright red blood since aprox 0300 this AM.     Review of patient's allergies indicates:   Allergen Reactions    Ciprofloxacin Nausea And Vomiting     Fever, chills, diarrhea    Benadryl [diphenhydramine hcl] Other (See Comments)     Unable to urinate     Time seen by provider: 7:39 AM    This is an 86 y.o. male who presents with complaint of hematemesis. The patient reports that he began vomiting this morning around 2 AM and noticed bright red blood in the vomit. He notes that he has vomited several times, but the amount of blood seems to be decreasing. He denies Hx of stomach bleeding or stomach surgeries. He states that he has had black, tarry stools, but the cause is unknown because the patient takes iron supplements due to Hx of anemia. The patient denies nausea, diarrhea, chest pain, SOB, weakness, or numbness. He denies smoking, drinking, or drug use. The patient takes Plavix and low-dose aspirin. He denies taking Ibuprofen, eating red foods, or drinking red beverages. No identifying, exacerbating, or alleviating factors for symptoms.      The history is provided by the patient.     Past Medical History:   Diagnosis Date    Anticoagulant long-term use     Arthritis     BPH (benign prostatic hyperplasia)     Chronic back pain     with injections Dr. Ephraim Hightower (Pain Management)    Coronary artery disease     Hypertension     PVD (peripheral vascular disease)     SOB (shortness of breath)      Past Surgical History:   Procedure Laterality Date    CORONARY STENT PLACEMENT      EYE SURGERY      bilateral cataracts    KIDNEY STONE SURGERY      PROSTATECTOMY  7/2013    TURP    VASCULAR SURGERY       No family history on  file.  Social History   Substance Use Topics    Smoking status: Former Smoker    Smokeless tobacco: Former User     Quit date: 4/20/2010      Comment: quit     Alcohol use No     Review of Systems   Constitutional: Negative for chills and fever.   HENT: Negative for congestion, nosebleeds and sore throat.    Eyes: Negative for discharge and itching.   Respiratory: Negative for cough and shortness of breath.    Cardiovascular: Negative for chest pain.   Gastrointestinal: Positive for vomiting (Bright red blood in the vomit). Negative for abdominal pain, constipation and nausea.        Black stool   Genitourinary: Negative for difficulty urinating and dysuria.   Musculoskeletal: Negative for back pain and neck pain.   Skin: Negative for rash.   Neurological: Negative for dizziness, weakness and numbness.   Hematological: Does not bruise/bleed easily.   Psychiatric/Behavioral: Negative for agitation and confusion.       Physical Exam     Initial Vitals [05/31/17 0721]   BP Pulse Resp Temp SpO2   (!) 129/58 69 18 98.6 °F (37 °C) 98 %     Physical Exam    Nursing note and vitals reviewed.  Constitutional: He appears well-developed and well-nourished. He is not diaphoretic. No distress.   HENT:   Head: Normocephalic and atraumatic.   Right Ear: External ear normal.   Left Ear: External ear normal.   Mouth/Throat: Oropharynx is clear and moist.   Oropharynx is clear and intact.   Eyes: Conjunctivae and EOM are normal. Pupils are equal, round, and reactive to light. Right eye exhibits no discharge. Left eye exhibits no discharge. No scleral icterus.   Conjunctivae are pink, clear, and intact.    Neck: Normal range of motion. Neck supple. No JVD present.   Cardiovascular: Normal rate, regular rhythm, normal heart sounds and intact distal pulses. Exam reveals no gallop and no friction rub.    No murmur heard.  Pulmonary/Chest: Breath sounds normal. No respiratory distress. He has no wheezes. He has no rhonchi. He has no  rales.   Clear to auscultation bilaterally.    Abdominal: Soft. Bowel sounds are normal. He exhibits no distension. There is no tenderness. There is no rebound and no guarding.   Benign abdominal exam. No audible bruits.   Genitourinary: Rectal exam shows guaiac positive stool. Guaiac positive stool. : Acceptable.  Genitourinary Comments: Guaiac is trace positive. Brown stool. NG lavage is clear. No further blood in emesis.   Musculoskeletal: Normal range of motion. He exhibits no edema or tenderness.   Neurological: He is alert and oriented to person, place, and time. He has normal strength. No sensory deficit.   Skin: Skin is warm and dry. No rash noted.   No skin tenting.    Psychiatric: He has a normal mood and affect. His behavior is normal. Judgment and thought content normal.         ED Course   Procedures  Labs Reviewed   CBC W/ AUTO DIFFERENTIAL - Abnormal; Notable for the following:        Result Value    WBC 13.35 (*)     Hemoglobin 12.9 (*)     MCH 26.4 (*)     RDW 20.7 (*)     Gran # 11.6 (*)     Lymph # 0.7 (*)     Gran% 87.1 (*)     Lymph% 5.5 (*)     All other components within normal limits   COMPREHENSIVE METABOLIC PANEL - Abnormal; Notable for the following:     Potassium 3.2 (*)     All other components within normal limits    Narrative:     Recoll. [id number] by [facility] [place] at 05/31/2017 08:42, reason: Specimen   hemolyzed, called JEFFERSON Amaro. Phleb is needed.   URINALYSIS - Abnormal; Notable for the following:     Protein, UA Trace (*)     Bilirubin (UA) 2+ (*)     Nitrite, UA Positive (*)     Leukocytes, UA 1+ (*)     All other components within normal limits   URINALYSIS MICROSCOPIC - Abnormal; Notable for the following:     WBC, UA 50 (*)     Bacteria, UA Many (*)     Granular Casts, UA 1 (*)     All other components within normal limits   PROTIME-INR    Narrative:     Recoll. [id number] by [facility] [place] at 05/31/2017 08:42, reason: Specimen   hemolyzed   TYPE & SCREEN   GROUP & RH      EKG Readings: (Independently Interpreted)   8:37 AM - Normal sinus rhythm at rate of 84. No ischemic changes.          Medical Decision Making:   Independently Interpreted Test(s):   I have ordered and independently interpreted EKG Reading(s) - see prior notes  Clinical Tests:   Lab Tests: Ordered and Reviewed  Medical Tests: Ordered and Reviewed    Additional MDM:   EKG: I have independently interpreted EKG(s) - see notes.          Scribe Attestation:   Scribe #1: I performed the above scribed service and the documentation accurately describes the services I performed. I attest to the accuracy of the note.    Attending Attestation:           Physician Attestation for Scribe:  Physician Attestation Statement for Scribe #1: I, Dr. Kruger, reviewed documentation, as scribed by Betina Quigley in my presence, and it is both accurate and complete.         Attending ED Notes:   Emergent evaluation of 86-year-old male with complaint of nausea vomiting and seeing bright red blood in his vomit.  Patient complains of associated weakness.  Patient is afebrile, nontoxic-appearing with stable vital signs.  Patient in no acute distress.  Abdominal exam is benign.  Urinary analysis is positive for protein, leukocytes and nitrites with many bacteria.  Potassium is 3.2.  Patient is white blood cell count 13,000.  H&H is 12.9 and 40.3.  The patient is extensive the counseled on his diagnosis and treatment, discharged in good condition and directed follow-up with his PCP in the next 24-48 hours.    Patient found to have UTI after discharge. Patient was called and prescription for bactrim ds was called in to his pharmacy of choice at [facility] on [address] Bactrim DS, twice a day for 10 days          ED Course     Clinical Impression:     1. Non-intractable vomiting with nausea, unspecified vomiting type    2. Upper GI bleed    3. Anemia, unspecified    4. Hypokalemia    5. Urinary tract infection, acute    6. Hematuria                 Nahid Brown MD  05/31/17 8155

## 2017-05-31 NOTE — ED NOTES
16fr NG tube inserted, placement verified w/ air bolus and return of GI contents. Pt tolerated well

## 2017-06-05 ENCOUNTER — HOSPITAL ENCOUNTER (OUTPATIENT)
Dept: RADIOLOGY | Facility: OTHER | Age: 82
Discharge: HOME | End: 2017-06-05
Attending: ANESTHESIOLOGY
Payer: MEDICARE

## 2017-06-05 DIAGNOSIS — M48.062 SPINAL STENOSIS, LUMBAR REGION, WITH NEUROGENIC CLAUDICATION: ICD-10-CM

## 2017-06-05 PROCEDURE — 72148 MRI LUMBAR SPINE W/O DYE: CPT | Mod: TC

## 2017-06-05 PROCEDURE — 72148 MRI LUMBAR SPINE W/O DYE: CPT | Mod: 26,,, | Performed by: RADIOLOGY

## 2017-06-27 ENCOUNTER — HOSPITAL ENCOUNTER (OUTPATIENT)
Facility: OTHER | Age: 82
Discharge: HOME | End: 2017-06-27
Attending: ANESTHESIOLOGY | Admitting: ANESTHESIOLOGY
Payer: MEDICARE

## 2017-06-27 ENCOUNTER — SURGERY (OUTPATIENT)
Age: 82
End: 2017-06-27

## 2017-06-27 VITALS
SYSTOLIC BLOOD PRESSURE: 174 MMHG | OXYGEN SATURATION: 99 % | DIASTOLIC BLOOD PRESSURE: 78 MMHG | HEIGHT: 70 IN | BODY MASS INDEX: 30.78 KG/M2 | TEMPERATURE: 98 F | HEART RATE: 68 BPM | WEIGHT: 215 LBS | RESPIRATION RATE: 18 BRPM

## 2017-06-27 DIAGNOSIS — M48.062 SPINAL STENOSIS, LUMBAR REGION, WITH NEUROGENIC CLAUDICATION: Primary | ICD-10-CM

## 2017-06-27 PROCEDURE — 62322 NJX INTERLAMINAR LMBR/SAC: CPT | Performed by: ANESTHESIOLOGY

## 2017-06-27 PROCEDURE — 62323 NJX INTERLAMINAR LMBR/SAC: CPT | Performed by: ANESTHESIOLOGY

## 2017-06-27 PROCEDURE — 99152 MOD SED SAME PHYS/QHP 5/>YRS: CPT | Mod: ,,, | Performed by: ANESTHESIOLOGY

## 2017-06-27 PROCEDURE — 25500020 PHARM REV CODE 255: Performed by: ANESTHESIOLOGY

## 2017-06-27 PROCEDURE — 63600175 PHARM REV CODE 636 W HCPCS: Performed by: ANESTHESIOLOGY

## 2017-06-27 PROCEDURE — 25000003 PHARM REV CODE 250: Performed by: ANESTHESIOLOGY

## 2017-06-27 PROCEDURE — 62323 NJX INTERLAMINAR LMBR/SAC: CPT | Mod: ,,, | Performed by: ANESTHESIOLOGY

## 2017-06-27 RX ORDER — METHYLPREDNISOLONE ACETATE 40 MG/ML
INJECTION, SUSPENSION INTRA-ARTICULAR; INTRALESIONAL; INTRAMUSCULAR; SOFT TISSUE
Status: DISCONTINUED | OUTPATIENT
Start: 2017-06-27 | End: 2017-06-27 | Stop reason: HOSPADM

## 2017-06-27 RX ORDER — BUPIVACAINE HYDROCHLORIDE 2.5 MG/ML
INJECTION, SOLUTION EPIDURAL; INFILTRATION; INTRACAUDAL
Status: DISCONTINUED | OUTPATIENT
Start: 2017-06-27 | End: 2017-06-27 | Stop reason: HOSPADM

## 2017-06-27 RX ORDER — MIDAZOLAM HYDROCHLORIDE 1 MG/ML
INJECTION INTRAMUSCULAR; INTRAVENOUS
Status: DISCONTINUED | OUTPATIENT
Start: 2017-06-27 | End: 2017-06-27 | Stop reason: HOSPADM

## 2017-06-27 RX ORDER — SODIUM CHLORIDE 9 MG/ML
INJECTION, SOLUTION INTRAVENOUS CONTINUOUS
Status: DISCONTINUED | OUTPATIENT
Start: 2017-06-27 | End: 2017-06-27 | Stop reason: HOSPADM

## 2017-06-27 RX ORDER — LIDOCAINE HYDROCHLORIDE 10 MG/ML
INJECTION INFILTRATION; PERINEURAL
Status: DISCONTINUED | OUTPATIENT
Start: 2017-06-27 | End: 2017-06-27 | Stop reason: HOSPADM

## 2017-06-27 RX ADMIN — IOHEXOL 10 ML: 300 INJECTION, SOLUTION INTRAVENOUS at 09:06

## 2017-06-27 RX ADMIN — MIDAZOLAM HYDROCHLORIDE 1 MG: 1 INJECTION, SOLUTION INTRAMUSCULAR; INTRAVENOUS at 09:06

## 2017-06-27 RX ADMIN — LIDOCAINE HYDROCHLORIDE 10 ML: 10 INJECTION, SOLUTION INFILTRATION; PERINEURAL at 09:06

## 2017-06-27 RX ADMIN — METHYLPREDNISOLONE ACETATE 40 MG: 40 INJECTION, SUSPENSION INTRA-ARTICULAR; INTRALESIONAL; INTRAMUSCULAR; SOFT TISSUE at 09:06

## 2017-06-27 RX ADMIN — SODIUM CHLORIDE: 0.9 INJECTION, SOLUTION INTRAVENOUS at 07:06

## 2017-06-27 RX ADMIN — BUPIVACAINE HYDROCHLORIDE 10 ML: 2.5 INJECTION, SOLUTION EPIDURAL; INFILTRATION; INTRACAUDAL; PERINEURAL at 09:06

## 2017-06-27 NOTE — OP NOTE
Lumbar Interlaminar Epidural Steroid Injection under Fluoroscopic Guidance.   Time-out taken to identify patient and procedure prior to starting the procedure.     06/27/2017    PROCEDURE: Interlaminar epidural steroid injection under fluoroscopic guidance.     Pre-Op diagnosis: lumbar stenosis with radiculopathy    Post-Op diagnosis: same    PHYSICIAN: EDWINA MATTHEW     Assistants:   Katelyn Pinedo MD PGY-3  I was present and supervising all critical portions of the procedure     ESTIMATED BLOOD LOSS: none.     COMPLICATIONS: none.     SPECIMENS: none    TECHNIQUE: With the patient laying in a prone position, the area was prepped and draped in the usual sterile fashion using ChloraPrep and a fenestrated drape. 1% lidocaine was given using a 27-gauge needle by raising a wheal and going down to the hub of the needle over the L4/5 interlaminar space, we were unable to enter ligamentum flavum at this level secondary to obstructive osteophytes, so we reapproached at the L5/S1 level.  The interlaminar space was then approached with a 3.5 inch 20-gauge Touhy needle was introduced under fluoroscopic guidance in the AP and Lateral view. Once the Ligamentum flavum was encountered loss of resistance to saline was used to enter the epidural space. With positive loss of resistance and negative CSF or Blood, 3mL contrast dye Omnipaque (300mg/ml) was injected to confirm placement and there was no vascular runoff. Then 1ml 40mg/ml Depomedrol + 1mL 0.25% Bupivicaine + 8mL preservative free normal saline was injected slowly. Displacement of the radio opaque contrast after injection of the medication confirmed that the medication went into the area of the epidural space.  The patient tolerated the procedure well.     Conscious sedation provided by M.D    The patient was monitored with continuous pulse oximetry, EKG, and intermittent blood pressure monitors.  The patient was hemodynamically stable throughout the entire process  was responsive to voice, and breathing spontaneously.  Supplemental O2 was provided at 2L/min via nasal cannula.  Patient was comfortable for the duration of the procedure.    There was a total of 1 mg IV Midazolam was titrated for the procedure      The patient was monitored after the procedure.   They were given post-procedure and discharge instructions to follow at home.  The patient was discharged in a stable condition.

## 2017-06-27 NOTE — H&P
"HPI Pt is an 85 yo M with PMH significant for lumbar radiculopathy      PMHx, PSHx, Allergies, Medications reviewed in epic    ROS negative except pain complaints in HPI    OBJECTIVE:    BP (!) 183/86 (BP Location: Right arm, Patient Position: Lying, BP Method: Automatic)   Pulse 75   Temp 97.9 °F (36.6 °C) (Oral)   Resp 18   Ht 5' 10" (1.778 m)   Wt 97.5 kg (215 lb)   SpO2 96%   BMI 30.85 kg/m²     PHYSICAL EXAMINATION:    GENERAL: Well appearing, in no acute distress, alert and oriented x3.  PSYCH:  Mood and affect appropriate.  SKIN: Skin color, texture, turgor normal, no rashes or lesions.  CV: RRR with palpation of the radial artery.  PULM: No evidence of respiratory difficulty, symmetric chest rise. Clear to auscultation.  NEURO: Cranial nerves grossly intact.    Plan:    Proceed with procedure as planned    Katelyn Pinedo  06/27/2017    "

## 2017-06-27 NOTE — DISCHARGE SUMMARY
Discharge Note  Short Stay      SUMMARY     Admit Date: 6/27/2017    Attending Physician: Edwina Matthew      Discharge Physician: Edwina Matthew      Discharge Date: 6/27/2017 9:27 AM       PROCEDURE: Interlaminar epidural steroid injection under fluoroscopic guidance.     Pre-Op diagnosis: lumbar stenosis with radiculopathy    Post-Op diagnosis: same    PHYSICIAN: EDWINA MATTHEW     Assistants:   Katelyn Pinedo MD PGY-3  I was present and supervising all critical portions of the procedure     Disposition: Home or self care    Patient Instructions:   Current Discharge Medication List      CONTINUE these medications which have NOT CHANGED    Details   amlodipine (NORVASC) 5 MG tablet Take 5 mg by mouth once daily.      aspirin (ECOTRIN) 81 MG EC tablet Take 81 mg by mouth once daily.      clopidogrel (PLAVIX) 75 mg tablet Refills: 3      doxazosin (CARDURA XL) 8 mg 24 hr tablet Take 8 mg by mouth daily with breakfast.      etodolac (LODINE) 400 MG tablet Take 400 mg by mouth 2 (two) times daily.      ferrous sulfate 325 mg (65 mg iron) Tab tablet Take 325 mg by mouth daily with breakfast.      finasteride (PROSCAR) 5 mg tablet Take 5 mg by mouth once daily.      lovastatin (MEVACOR) 40 MG tablet Take 40 mg by mouth every evening.      ondansetron (ZOFRAN ODT) 4 MG TbDL Take 1 tablet (4 mg total) by mouth every 6 (six) hours as needed (nausea).  Qty: 12 tablet, Refills: 0      predniSONE (DELTASONE) 5 MG tablet Take 5 mg by mouth once daily.      saw palmetto 500 MG capsule Take 500 mg by mouth 2 (two) times daily.             Resume home diet and activity

## 2017-06-27 NOTE — DISCHARGE INSTRUCTIONS

## 2017-07-27 NOTE — PROGRESS NOTES
Chronic Pain - follow Up    Referring Physician: No ref. provider found    Chief Complaint:   Chief Complaint   Patient presents with    Low-back Pain     follow up after procedure        SUBJECTIVE: Disclaimer: This note has been generated using voice-recognition software. There may be typographical errors that have been missed during proof-reading.    Interval History 7/28/2017:  The patient returns today for follow up of lower back pain.  He is s/p L5-S1 IL LEONIDES with limited pain relief.  He was originally scheduled for L4-5 IL LEONIDES but the level of change due to difficulty with advancing the needle.  He has had 3 epidurals this year with limited long-term benefit.  He would like to further discuss SCS trial at this time.  He does not wish to have a neurosurgical consult and does not want major back surgery.  He is currently taking Bactrim for a UTI.  His pain today is 7/10.  The patient denies any bowel or bladder incontinence or signs of saddle paresthesia.      Interval History 5/25/2017  S/p L4-5 interlaminar epidural steroid injection on 5/10/2017 with significant improvement in the right lower extremity but continued pain in the left and describing symptoms of neurogenic claudication.  His last MRI was from 2009 and we have previously discussed spinal cord simulation is an option for the future for both his peripheral arterial disease and neurogenic claudication.  No other health changes since previous encounter.    Interval history 3/9/2017:  Patient is status post lumbar interlaminar epidural steroid injection at L4-5 on 2/22/2017 reports overall 50% relief of his pain he does continue to have peripheral arterial disease which is causing symptoms of vascular claudication.  No other health changes since previous encounter patient has resumed his Plavix.    Initial encounter:    Jem Pak presents to the clinic for the evaluation of low back pain. The pain started over 20 years ago following nothing  in particular and symptoms have been worsening.    Brief history:  When the pain originally began he reports having an injection around 2000 at [facility].  Most recently, he was seeing Dr. Ephraim Mock and had multiple steroid injections, although he is unsure of the type.  He believes his last injection was in 2014 which provided him about 75% pain relief for about one year.    He reports a history of CAD and PAD.  He reports having a heart attack in 1992 which resulted in a stent being placed.   He is currently on Aspirin and Plavix.  He also has a h/o prostate cancer and had a prostatectomy in the past.    Pain Description:    The pain is located in the low back area and radiates to the bilateral lower extremities at knee.      At BEST  6/10     At WORST  10/10 on the WORST day.      On average pain is rated as 5/10.     Today the pain is rated as 5/10    The pain is described as dull and throbbing      Symptoms interfere with daily activity and sleeping.     Exacerbating factors: Laying, Bending, Walking, Lifting and Getting out of bed/chair.      Mitigating factors rest, sitting and injections.     Patient denies night fever/night sweats, urinary incontinence, bowel incontinence, significant weight loss and significant motor weakness.  Patient denies any suicidal or homicidal ideations    Pain Medications:  Current:  etodolac    Tried in Past:  NSAIDs - Etodolac  TCA -Never  SNRI -Never  Anti-convulsants -Never  Muscle Relaxants -Never  Opioids-Never    Physical Therapy/Home Exercise: PT years ago with limited relief.     report:  Not applicable    Pain Procedures: Lumbar injections by Dr. Mock- last in 2014 with relief, records not available on today's visit.  Lumbar interlaminar epidural steroid injection at L4-5 2/22/2017  5/10/2017 - L4/5 ILESI  6/27/17 L5-S1 IL LEONIDES- no relief    Chiropractor -never  Acupuncture - never  TENS unit -never  Spinal decompression -never  Joint replacement  -never    Imaging: MRI lumbar spine 9/2009  Grade I anterolisthesis of L3 on L4.    1.3 x 1 cm right facet synovial cyst at L2/3 causing moderate to severe  spinal canal stenosis    Multilevel degenerative disc disease with moderate central canal narrowing  at L3/4 and L4/5 levels as described above.        Past Medical History:   Diagnosis Date    Anticoagulant long-term use     Arthritis     BPH (benign prostatic hyperplasia)     Chronic back pain     with injections Dr. Ephraim Hightower (Pain Management)    Coronary artery disease     Hypertension     PVD (peripheral vascular disease)     SOB (shortness of breath)      Past Surgical History:   Procedure Laterality Date    CORONARY STENT PLACEMENT      EYE SURGERY      bilateral cataracts    KIDNEY STONE SURGERY      PROSTATECTOMY  7/2013    TURP    VASCULAR SURGERY       Social History     Social History    Marital status: Single     Spouse name: N/A    Number of children: N/A    Years of education: N/A     Occupational History    Not on file.     Social History Main Topics    Smoking status: Former Smoker    Smokeless tobacco: Former User     Quit date: 4/20/2010      Comment: quit     Alcohol use No    Drug use: No    Sexual activity: Not on file     Other Topics Concern    Not on file     Social History Narrative    No narrative on file     No family history on file.    Review of patient's allergies indicates:   Allergen Reactions    Ciprofloxacin Nausea And Vomiting     Fever, chills, diarrhea    Benadryl [diphenhydramine hcl] Other (See Comments)     Unable to urinate       Current Outpatient Prescriptions   Medication Sig    amlodipine (NORVASC) 5 MG tablet Take 5 mg by mouth once daily.    aspirin (ECOTRIN) 81 MG EC tablet Take 81 mg by mouth once daily.    clopidogrel (PLAVIX) 75 mg tablet     doxazosin (CARDURA XL) 8 mg 24 hr tablet Take 8 mg by mouth daily with breakfast.    etodolac (LODINE) 400 MG tablet Take 400 mg by  "mouth 2 (two) times daily.    ferrous sulfate 325 mg (65 mg iron) Tab tablet Take 325 mg by mouth daily with breakfast.    finasteride (PROSCAR) 5 mg tablet Take 5 mg by mouth once daily.    lovastatin (MEVACOR) 40 MG tablet Take 40 mg by mouth every evening.    ondansetron (ZOFRAN ODT) 4 MG TbDL Take 1 tablet (4 mg total) by mouth every 6 (six) hours as needed (nausea).    predniSONE (DELTASONE) 5 MG tablet Take 5 mg by mouth once daily.    saw palmetto 500 MG capsule Take 500 mg by mouth 2 (two) times daily.     No current facility-administered medications for this visit.        REVIEW OF SYSTEMS:    GENERAL:  No weight loss, malaise or fevers.  RESPIRATORY:  Negative for cough, wheezing or shortness of breath, patient denies any recent URI.  CARDIOVASCULAR:  Negative for chest pain, or palpitations. Leg swelling secondary to heart disease.  GI:  Negative for abdominal discomfort, blood in stools or black stools or change in bowel habits.  MUSCULOSKELETAL:  See HPI.  SKIN:  Negative for lesions, rash, and itching.  PSYCH:  No mood disorder or recent psychosocial stressors.  Patients sleep is not disturbed secondary to pain.  HEMATOLOGY/LYMPHOLOGY:  Negative for prolonged bleeding or swollen nodes. Patient reports bruising easily secondary to blood thinners. Patient is currently taking Plavix and aspirin.  ENDO: No history of diabetes or thyroid dysfunction  NEURO:   No history of headaches, syncope, paralysis, seizures or tremors.  All other reviewed and negative other than HPI.    OBJECTIVE:    BP (!) 157/78   Pulse 73   Temp 98 °F (36.7 °C) (Oral)   Resp 18   Ht 5' 10" (1.778 m)   Wt 99.7 kg (219 lb 12.8 oz)   BMI 31.54 kg/m²     PHYSICAL EXAMINATION:    GENERAL: Well appearing, in no acute distress, alert and oriented x3.  PSYCH:  Mood and affect appropriate.  SKIN:  No evidence of infection from previous injection site.  HEAD/FACE:  Normocephalic, atraumatic.   CV: RRR with palpation of the radial " artery.  PULM: No evidence of respiratory difficulty, symmetric chest rise.  BACK: Straight leg raising in the supine position is negative to radicular pain.  No pain to palpation over the facet joints.  Pain with lumbar flexion greater than extension.  Positive facet loading bilaterally.  Painful palpation to left SI joint.  MEHRDAD is negative bilaterally.  EXTREMITIES: Lower extremity peripheral joint ROM is full and pain free without obvious instability or laxity in all extremities.  Pain with palpation to posterior area of left shoulder.  Negative NEER and Huff. No deformities, edema, or skin discoloration. Good capillary refill.  Lower extremity strength equal and symmetric 5/5 with knee extension/flex.  Dorsiflexion of ankle 4/5 bilaterally.  MUSCULOSKELETAL:  There is no pain with palpation over the sacroiliac joints bilaterally.  FABERs test is negative.  FADIRs test is negative.   Bilateral upper and lower extremity strength is normal and symmetric.  No atrophy or tone abnormalities are noted.  NEURO: Cranial nerves grossly intact.  GAIT: Antalgic, ambulates without assistance     ASSESSMENT: 86 y.o. year old male with lower back pain, consistent with lumbar radiculopathy, lumbar DDD and lumbar facet arthropathy.    1. Spinal stenosis, lumbar region, with neurogenic claudication     2. Facet arthritis of lumbar region     3. Lumbar disc herniation with radiculopathy     4. DDD (degenerative disc disease), lumbar     5. Lumbar radiculopathy         PLAN:     - Previous imaging was reviewed and discussed with the patient today.    - Today, we discussed lumbar SCS trial in detail.  The patient would like to proceed.  I will refer him for psych clearance at this time.    - We will need to obtain permission for patient hold Plavix prior to trial.    - I cautioned him regarding the use of etodolac for cardiac history and Plavix.  He will make sure that his cardiologist is aware of this medication.    - RTC  after trial.  We will review psych notes once complete and schedule trial if approved.    - Dr. Hudson was consulted on the patient and agrees with this plan.      The above plan and management options were discussed at length with patient. Patient is in agreement with the above and verbalized understanding.     Krystina Smith  07/27/2017

## 2017-07-28 ENCOUNTER — OFFICE VISIT (OUTPATIENT)
Dept: PAIN MEDICINE | Facility: CLINIC | Age: 82
End: 2017-07-28
Payer: MEDICARE

## 2017-07-28 VITALS
DIASTOLIC BLOOD PRESSURE: 78 MMHG | BODY MASS INDEX: 31.47 KG/M2 | HEIGHT: 70 IN | SYSTOLIC BLOOD PRESSURE: 157 MMHG | HEART RATE: 73 BPM | WEIGHT: 219.81 LBS | TEMPERATURE: 98 F | RESPIRATION RATE: 18 BRPM

## 2017-07-28 DIAGNOSIS — M51.36 DDD (DEGENERATIVE DISC DISEASE), LUMBAR: ICD-10-CM

## 2017-07-28 DIAGNOSIS — M54.16 LUMBAR RADICULOPATHY: ICD-10-CM

## 2017-07-28 DIAGNOSIS — M47.816 FACET ARTHRITIS OF LUMBAR REGION: ICD-10-CM

## 2017-07-28 DIAGNOSIS — M48.062 SPINAL STENOSIS, LUMBAR REGION, WITH NEUROGENIC CLAUDICATION: ICD-10-CM

## 2017-07-28 DIAGNOSIS — G89.4 CHRONIC PAIN DISORDER: Primary | ICD-10-CM

## 2017-07-28 DIAGNOSIS — M51.16 LUMBAR DISC HERNIATION WITH RADICULOPATHY: ICD-10-CM

## 2017-07-28 PROCEDURE — 99213 OFFICE O/P EST LOW 20 MIN: CPT | Mod: S$GLB,,, | Performed by: NURSE PRACTITIONER

## 2017-07-28 PROCEDURE — 99999 PR PBB SHADOW E&M-EST. PATIENT-LVL III: CPT | Mod: PBBFAC,,, | Performed by: NURSE PRACTITIONER

## 2017-07-28 PROCEDURE — 1159F MED LIST DOCD IN RCRD: CPT | Mod: S$GLB,,, | Performed by: NURSE PRACTITIONER

## 2017-07-28 PROCEDURE — 1125F AMNT PAIN NOTED PAIN PRSNT: CPT | Mod: S$GLB,,, | Performed by: NURSE PRACTITIONER

## 2017-07-28 RX ORDER — SULFAMETHOXAZOLE AND TRIMETHOPRIM 800; 160 MG/1; MG/1
TABLET ORAL
COMMUNITY
Start: 2017-07-25 | End: 2021-05-31

## 2017-07-28 RX ORDER — TEMAZEPAM 30 MG/1
CAPSULE ORAL
Refills: 2 | COMMUNITY
Start: 2017-06-26 | End: 2021-05-31

## 2018-01-18 ENCOUNTER — OFFICE VISIT (OUTPATIENT)
Dept: CARDIOLOGY | Facility: CLINIC | Age: 83
End: 2018-01-18
Attending: INTERNAL MEDICINE
Payer: MEDICARE

## 2018-01-18 VITALS
WEIGHT: 224 LBS | SYSTOLIC BLOOD PRESSURE: 176 MMHG | HEIGHT: 70 IN | HEART RATE: 64 BPM | BODY MASS INDEX: 32.07 KG/M2 | DIASTOLIC BLOOD PRESSURE: 81 MMHG

## 2018-01-18 DIAGNOSIS — M51.36 DDD (DEGENERATIVE DISC DISEASE), LUMBAR: ICD-10-CM

## 2018-01-18 DIAGNOSIS — I10 ESSENTIAL HYPERTENSION: ICD-10-CM

## 2018-01-18 DIAGNOSIS — I73.9 PAD (PERIPHERAL ARTERY DISEASE): ICD-10-CM

## 2018-01-18 DIAGNOSIS — Z87.891 EX-SMOKER: ICD-10-CM

## 2018-01-18 DIAGNOSIS — I25.10 CORONARY ARTERY DISEASE INVOLVING NATIVE CORONARY ARTERY OF NATIVE HEART WITHOUT ANGINA PECTORIS: Primary | ICD-10-CM

## 2018-01-18 PROCEDURE — 99213 OFFICE O/P EST LOW 20 MIN: CPT | Mod: S$GLB,,, | Performed by: INTERNAL MEDICINE

## 2018-01-18 NOTE — PROGRESS NOTES
Subjective:    Patient ID:  Jem Pak is a 87 y.o. male     HPI Here for F/U of CAD, PAD, HBP, ex smoker    I feel well. I recently had some left sciatic pain, now better.    Current Outpatient Prescriptions   Medication Sig    amlodipine (NORVASC) 5 MG tablet Take 5 mg by mouth once daily.    aspirin (ECOTRIN) 81 MG EC tablet Take 81 mg by mouth once daily.    clopidogrel (PLAVIX) 75 mg tablet     doxazosin (CARDURA XL) 8 mg 24 hr tablet Take 8 mg by mouth daily with breakfast.    etodolac (LODINE) 400 MG tablet Take 400 mg by mouth 2 (two) times daily.    ferrous sulfate 325 mg (65 mg iron) Tab tablet Take 325 mg by mouth daily with breakfast.    finasteride (PROSCAR) 5 mg tablet Take 5 mg by mouth once daily.    lovastatin (MEVACOR) 40 MG tablet Take 40 mg by mouth every evening.    ondansetron (ZOFRAN ODT) 4 MG TbDL Take 1 tablet (4 mg total) by mouth every 6 (six) hours as needed (nausea).    predniSONE (DELTASONE) 5 MG tablet Take 5 mg by mouth once daily. Take every other day.    saw palmetto 500 MG capsule Take 500 mg by mouth 2 (two) times daily.    sulfamethoxazole-trimethoprim 800-160mg (BACTRIM DS) 800-160 mg Tab     temazepam (RESTORIL) 30 mg capsule TAKE 1 CAPSULE BY MOUTH AS NEEDED FOR SLEEP     No current facility-administered medications for this visit.          Review of Systems   Constitution: Negative for chills, decreased appetite, fever, weight gain and weight loss.   HENT: Negative for congestion, hearing loss and sore throat.    Eyes: Negative for blurred vision, double vision and visual disturbance.   Cardiovascular: Negative for chest pain, claudication, dyspnea on exertion, leg swelling, palpitations and syncope.   Respiratory: Negative for cough, hemoptysis, shortness of breath, sputum production and wheezing.    Endocrine: Negative for cold intolerance and heat intolerance.   Hematologic/Lymphatic: Negative for bleeding problem. Does not bruise/bleed easily.   Skin:  "Negative for color change, dry skin, flushing and itching.   Musculoskeletal: Positive for arthritis. Negative for back pain, joint pain and myalgias.   Gastrointestinal: Negative for abdominal pain, anorexia, constipation, diarrhea, dysphagia, nausea and vomiting.        No bleeding per rectum   Genitourinary: Negative for dysuria, flank pain, frequency, hematuria and nocturia.   Neurological: Negative for dizziness, headaches, light-headedness, loss of balance, seizures and tremors.   Psychiatric/Behavioral: Negative for altered mental status and depression.         Vitals:    01/18/18 1020   BP: (!) 176/81   Pulse: 64   Weight: 101.6 kg (224 lb)   Height: 5' 10" (1.778 m)   BP rechecked: 135/75 mm hg.    Objective:    Physical Exam   Constitutional: He is oriented to person, place, and time. He appears well-developed and well-nourished.   HENT:   Head: Normocephalic and atraumatic.   Right Ear: External ear normal.   Left Ear: External ear normal.   Nose: Nose normal.   Eyes: Conjunctivae and EOM are normal. Pupils are equal, round, and reactive to light. No scleral icterus.   Neck: Normal range of motion. Neck supple. No JVD present. No tracheal deviation present. No thyromegaly present.   Cardiovascular: Normal rate, regular rhythm and normal heart sounds.  Exam reveals no gallop and no friction rub.    No murmur heard.  Pulmonary/Chest: Effort normal and breath sounds normal. No respiratory distress. He has no rales. He exhibits no tenderness.   Abdominal: Soft. Bowel sounds are normal. He exhibits no distension and no mass. There is no tenderness.   Musculoskeletal: Normal range of motion. He exhibits no edema or tenderness.   Lymphadenopathy:     He has no cervical adenopathy.   Neurological: He is alert and oriented to person, place, and time. He has normal reflexes. No cranial nerve deficit. Coordination normal.   Skin: Skin is warm and dry. No rash noted.   Psychiatric: He has a normal mood and affect. " His behavior is normal.         Assessment:       1. Coronary artery disease involving native coronary artery of native heart without angina pectoris    2. PAD (peripheral artery disease)    3. Essential hypertension    4. DDD (degenerative disc disease), lumbar    5. Ex-smoker         Plan:       Stable  Continue the same

## 2018-05-17 ENCOUNTER — OFFICE VISIT (OUTPATIENT)
Dept: CARDIOLOGY | Facility: CLINIC | Age: 83
End: 2018-05-17
Attending: INTERNAL MEDICINE
Payer: MEDICARE

## 2018-05-17 VITALS
WEIGHT: 221 LBS | HEIGHT: 70 IN | DIASTOLIC BLOOD PRESSURE: 68 MMHG | HEART RATE: 64 BPM | SYSTOLIC BLOOD PRESSURE: 141 MMHG | BODY MASS INDEX: 31.64 KG/M2

## 2018-05-17 DIAGNOSIS — Z87.891 EX-SMOKER: ICD-10-CM

## 2018-05-17 DIAGNOSIS — M51.16 LUMBAR DISC HERNIATION WITH RADICULOPATHY: ICD-10-CM

## 2018-05-17 DIAGNOSIS — I73.9 PAD (PERIPHERAL ARTERY DISEASE): ICD-10-CM

## 2018-05-17 DIAGNOSIS — I10 ESSENTIAL HYPERTENSION: ICD-10-CM

## 2018-05-17 DIAGNOSIS — I25.10 CORONARY ARTERY DISEASE INVOLVING NATIVE CORONARY ARTERY OF NATIVE HEART WITHOUT ANGINA PECTORIS: Primary | ICD-10-CM

## 2018-05-17 PROCEDURE — 93000 ELECTROCARDIOGRAM COMPLETE: CPT | Mod: S$GLB,,, | Performed by: INTERNAL MEDICINE

## 2018-05-17 PROCEDURE — 99213 OFFICE O/P EST LOW 20 MIN: CPT | Mod: S$GLB,,, | Performed by: INTERNAL MEDICINE

## 2018-05-17 NOTE — PROGRESS NOTES
Subjective:    Patient ID:  Jem Pak is a 87 y.o. 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    Knock on wood, I have been doing quite well lately.  My back pain is manageable.  I tend to stay physically active.  I would stay away from lifting anything heavy.    Current Outpatient Prescriptions   Medication Sig    amlodipine (NORVASC) 5 MG tablet Take 5 mg by mouth once daily.    aspirin (ECOTRIN) 81 MG EC tablet Take 81 mg by mouth once daily.    clopidogrel (PLAVIX) 75 mg tablet     doxazosin (CARDURA XL) 8 mg 24 hr tablet Take 8 mg by mouth daily with breakfast.    etodolac (LODINE) 400 MG tablet Take 400 mg by mouth 2 (two) times daily.    ferrous sulfate 325 mg (65 mg iron) Tab tablet Take 325 mg by mouth daily with breakfast.    finasteride (PROSCAR) 5 mg tablet Take 5 mg by mouth once daily.    lovastatin (MEVACOR) 40 MG tablet Take 40 mg by mouth every evening.    ondansetron (ZOFRAN ODT) 4 MG TbDL Take 1 tablet (4 mg total) by mouth every 6 (six) hours as needed (nausea).    predniSONE (DELTASONE) 5 MG tablet Take 5 mg by mouth once daily. Take every other day.    saw palmetto 500 MG capsule Take 500 mg by mouth 2 (two) times daily.    sulfamethoxazole-trimethoprim 800-160mg (BACTRIM DS) 800-160 mg Tab     temazepam (RESTORIL) 30 mg capsule TAKE 1 CAPSULE BY MOUTH AS NEEDED FOR SLEEP     No current facility-administered medications for this visit.          Review of Systems   Constitution: Negative for chills, decreased appetite, fever, weight gain and weight loss.   HENT: Negative for congestion, hearing loss and sore throat.    Eyes: Negative for blurred vision, double vision and visual disturbance.   Cardiovascular: Negative for chest pain, claudication, dyspnea on exertion, leg swelling, palpitations and syncope.   Respiratory:  "Negative for cough, hemoptysis, shortness of breath, sputum production and wheezing.    Endocrine: Negative for cold intolerance and heat intolerance.   Hematologic/Lymphatic: Negative for bleeding problem. Does not bruise/bleed easily.   Skin: Negative for color change, dry skin, flushing and itching.   Musculoskeletal: Positive for back pain. Negative for joint pain and myalgias.   Gastrointestinal: Negative for abdominal pain, anorexia, constipation, diarrhea, dysphagia, nausea and vomiting.        No bleeding per rectum   Genitourinary: Negative for dysuria, flank pain, frequency, hematuria and nocturia.   Neurological: Negative for dizziness, headaches, light-headedness, loss of balance, seizures and tremors.   Psychiatric/Behavioral: Negative for altered mental status and depression.         Vitals:    05/17/18 1017   BP: (!) 141/68   Pulse: 64   Weight: 100.2 kg (221 lb)   Height: 5' 10" (1.778 m)     Objective:    Physical Exam   Constitutional: He is oriented to person, place, and time. He appears well-developed and well-nourished.   HENT:   Head: Normocephalic and atraumatic.   Right Ear: External ear normal.   Left Ear: External ear normal.   Nose: Nose normal.   Eyes: Conjunctivae and EOM are normal. Pupils are equal, round, and reactive to light. No scleral icterus.   Neck: Normal range of motion. Neck supple. No JVD present. No tracheal deviation present. No thyromegaly present.   Cardiovascular: Normal rate, regular rhythm and normal heart sounds.  Exam reveals no gallop and no friction rub.    No murmur heard.  Pulmonary/Chest: Effort normal and breath sounds normal. No respiratory distress. He has no rales. He exhibits no tenderness.   Abdominal: Soft. Bowel sounds are normal. He exhibits no distension and no mass. There is no tenderness.   Musculoskeletal: Normal range of motion. He exhibits no edema or tenderness.   Lymphadenopathy:     He has no cervical adenopathy.   Neurological: He is alert " and oriented to person, place, and time. He has normal reflexes. No cranial nerve deficit. Coordination normal.   Skin: Skin is warm and dry. No rash noted.   Psychiatric: He has a normal mood and affect. His behavior is normal.         Assessment:       1. Coronary artery disease involving native coronary artery of native heart without angina pectoris    2. PAD (peripheral artery disease)    3. Essential hypertension    4. Lumbar disc herniation with radiculopathy    5. Ex-smoker         Plan:       Stable    Continue the same medications.

## 2018-05-21 RX ORDER — PREDNISONE 5 MG/1
TABLET ORAL
Qty: 90 TABLET | Refills: 4 | Status: SHIPPED | OUTPATIENT
Start: 2018-05-21 | End: 2019-08-02 | Stop reason: SDUPTHER

## 2018-09-04 ENCOUNTER — HOSPITAL ENCOUNTER (OUTPATIENT)
Facility: OTHER | Age: 83
Discharge: HOME | End: 2018-09-06
Attending: EMERGENCY MEDICINE | Admitting: EMERGENCY MEDICINE
Payer: MEDICARE

## 2018-09-04 DIAGNOSIS — K92.2 ACUTE LOWER GI BLEEDING: Primary | ICD-10-CM

## 2018-09-04 LAB
ABO + RH BLD: NORMAL
ANION GAP SERPL CALC-SCNC: 13 MMOL/L
APTT BLDCRRT: 23.8 SEC
BASOPHILS # BLD AUTO: 0.01 K/UL
BASOPHILS # BLD AUTO: 0.01 K/UL
BASOPHILS # BLD AUTO: 0.02 K/UL
BASOPHILS # BLD AUTO: 0.03 K/UL
BASOPHILS NFR BLD: 0.2 %
BASOPHILS NFR BLD: 0.2 %
BASOPHILS NFR BLD: 0.3 %
BASOPHILS NFR BLD: 0.6 %
BLD GP AB SCN CELLS X3 SERPL QL: NORMAL
BUN SERPL-MCNC: 19 MG/DL
CALCIUM SERPL-MCNC: 8.3 MG/DL
CHLORIDE SERPL-SCNC: 109 MMOL/L
CO2 SERPL-SCNC: 23 MMOL/L
CREAT SERPL-MCNC: 1.2 MG/DL
DIFFERENTIAL METHOD: ABNORMAL
EOSINOPHIL # BLD AUTO: 0.1 K/UL
EOSINOPHIL # BLD AUTO: 0.2 K/UL
EOSINOPHIL NFR BLD: 0.9 %
EOSINOPHIL NFR BLD: 1.6 %
EOSINOPHIL NFR BLD: 1.6 %
EOSINOPHIL NFR BLD: 3.2 %
ERYTHROCYTE [DISTWIDTH] IN BLOOD BY AUTOMATED COUNT: 14.6 %
EST. GFR  (AFRICAN AMERICAN): >60 ML/MIN/1.73 M^2
EST. GFR  (NON AFRICAN AMERICAN): 54 ML/MIN/1.73 M^2
GLUCOSE SERPL-MCNC: 130 MG/DL
HCT VFR BLD AUTO: 33 %
HCT VFR BLD AUTO: 34.1 %
HCT VFR BLD AUTO: 34.4 %
HCT VFR BLD AUTO: 37.3 %
HGB BLD-MCNC: 10.5 G/DL
HGB BLD-MCNC: 10.8 G/DL
HGB BLD-MCNC: 10.8 G/DL
HGB BLD-MCNC: 11.9 G/DL
INR PPP: 0.9
LYMPHOCYTES # BLD AUTO: 0.8 K/UL
LYMPHOCYTES # BLD AUTO: 1.4 K/UL
LYMPHOCYTES # BLD AUTO: 1.5 K/UL
LYMPHOCYTES # BLD AUTO: 1.7 K/UL
LYMPHOCYTES NFR BLD: 12.1 %
LYMPHOCYTES NFR BLD: 21.7 %
LYMPHOCYTES NFR BLD: 23.6 %
LYMPHOCYTES NFR BLD: 33.3 %
MCH RBC QN AUTO: 27.8 PG
MCH RBC QN AUTO: 28.1 PG
MCH RBC QN AUTO: 28.1 PG
MCH RBC QN AUTO: 28.3 PG
MCHC RBC AUTO-ENTMCNC: 31.4 G/DL
MCHC RBC AUTO-ENTMCNC: 31.7 G/DL
MCHC RBC AUTO-ENTMCNC: 31.8 G/DL
MCHC RBC AUTO-ENTMCNC: 31.9 G/DL
MCV RBC AUTO: 88 FL
MCV RBC AUTO: 89 FL
MONOCYTES # BLD AUTO: 0.3 K/UL
MONOCYTES # BLD AUTO: 0.6 K/UL
MONOCYTES NFR BLD: 4.6 %
MONOCYTES NFR BLD: 5.1 %
MONOCYTES NFR BLD: 5.4 %
MONOCYTES NFR BLD: 8.5 %
NEUTROPHILS # BLD AUTO: 2.9 K/UL
NEUTROPHILS # BLD AUTO: 4.3 K/UL
NEUTROPHILS # BLD AUTO: 4.5 K/UL
NEUTROPHILS # BLD AUTO: 5.4 K/UL
NEUTROPHILS NFR BLD: 57.3 %
NEUTROPHILS NFR BLD: 69.2 %
NEUTROPHILS NFR BLD: 71.7 %
NEUTROPHILS NFR BLD: 78.1 %
PLATELET # BLD AUTO: 143 K/UL
PLATELET # BLD AUTO: 149 K/UL
PLATELET # BLD AUTO: 150 K/UL
PLATELET # BLD AUTO: 164 K/UL
PMV BLD AUTO: 11.6 FL
PMV BLD AUTO: 11.8 FL
PMV BLD AUTO: 12 FL
PMV BLD AUTO: 12.1 FL
POTASSIUM SERPL-SCNC: 3 MMOL/L
PROTHROMBIN TIME: 10.5 SEC
RBC # BLD AUTO: 3.74 M/UL
RBC # BLD AUTO: 3.85 M/UL
RBC # BLD AUTO: 3.88 M/UL
RBC # BLD AUTO: 4.2 M/UL
SODIUM SERPL-SCNC: 145 MMOL/L
WBC # BLD AUTO: 5.04 K/UL
WBC # BLD AUTO: 6.26 K/UL
WBC # BLD AUTO: 6.32 K/UL
WBC # BLD AUTO: 6.86 K/UL

## 2018-09-04 PROCEDURE — G0378 HOSPITAL OBSERVATION PER HR: HCPCS

## 2018-09-04 PROCEDURE — 25000003 PHARM REV CODE 250: Performed by: INTERNAL MEDICINE

## 2018-09-04 PROCEDURE — 25000003 PHARM REV CODE 250: Performed by: EMERGENCY MEDICINE

## 2018-09-04 PROCEDURE — 99284 EMERGENCY DEPT VISIT MOD MDM: CPT | Mod: 25

## 2018-09-04 PROCEDURE — 85610 PROTHROMBIN TIME: CPT

## 2018-09-04 PROCEDURE — 85730 THROMBOPLASTIN TIME PARTIAL: CPT

## 2018-09-04 PROCEDURE — 80048 BASIC METABOLIC PNL TOTAL CA: CPT

## 2018-09-04 PROCEDURE — 94761 N-INVAS EAR/PLS OXIMETRY MLT: CPT

## 2018-09-04 PROCEDURE — 63600175 PHARM REV CODE 636 W HCPCS: Performed by: INTERNAL MEDICINE

## 2018-09-04 PROCEDURE — 86850 RBC ANTIBODY SCREEN: CPT

## 2018-09-04 PROCEDURE — 85025 COMPLETE CBC W/AUTO DIFF WBC: CPT | Mod: 91

## 2018-09-04 PROCEDURE — 36415 COLL VENOUS BLD VENIPUNCTURE: CPT

## 2018-09-04 RX ORDER — POTASSIUM CHLORIDE 20 MEQ/15ML
20 SOLUTION ORAL ONCE
Status: COMPLETED | OUTPATIENT
Start: 2018-09-04 | End: 2018-09-04

## 2018-09-04 RX ORDER — POLYETHYLENE GLYCOL 3350, SODIUM SULFATE ANHYDROUS, SODIUM BICARBONATE, SODIUM CHLORIDE, POTASSIUM CHLORIDE 236; 22.74; 6.74; 5.86; 2.97 G/4L; G/4L; G/4L; G/4L; G/4L
4000 POWDER, FOR SOLUTION ORAL ONCE
Status: COMPLETED | OUTPATIENT
Start: 2018-09-04 | End: 2018-09-04

## 2018-09-04 RX ORDER — FERROUS SULFATE 325(65) MG
325 TABLET ORAL
Status: DISCONTINUED | OUTPATIENT
Start: 2018-09-04 | End: 2018-09-04 | Stop reason: SDUPTHER

## 2018-09-04 RX ORDER — ONDANSETRON 2 MG/ML
4 INJECTION INTRAMUSCULAR; INTRAVENOUS EVERY 6 HOURS PRN
Status: DISCONTINUED | OUTPATIENT
Start: 2018-09-04 | End: 2018-09-06 | Stop reason: HOSPADM

## 2018-09-04 RX ORDER — LOVASTATIN 20 MG/1
40 TABLET ORAL NIGHTLY
Status: DISCONTINUED | OUTPATIENT
Start: 2018-09-04 | End: 2018-09-06 | Stop reason: HOSPADM

## 2018-09-04 RX ORDER — PANTOPRAZOLE SODIUM 40 MG/1
40 TABLET, DELAYED RELEASE ORAL DAILY
Status: DISCONTINUED | OUTPATIENT
Start: 2018-09-04 | End: 2018-09-06 | Stop reason: HOSPADM

## 2018-09-04 RX ORDER — FINASTERIDE 5 MG/1
5 TABLET, FILM COATED ORAL DAILY
Status: DISCONTINUED | OUTPATIENT
Start: 2018-09-04 | End: 2018-09-06 | Stop reason: HOSPADM

## 2018-09-04 RX ORDER — AMLODIPINE BESYLATE 5 MG/1
5 TABLET ORAL DAILY
Status: DISCONTINUED | OUTPATIENT
Start: 2018-09-04 | End: 2018-09-06 | Stop reason: HOSPADM

## 2018-09-04 RX ORDER — DOXAZOSIN 2 MG/1
8 TABLET ORAL
Status: DISCONTINUED | OUTPATIENT
Start: 2018-09-04 | End: 2018-09-06 | Stop reason: HOSPADM

## 2018-09-04 RX ORDER — FERROUS SULFATE 325(65) MG
325 TABLET, DELAYED RELEASE (ENTERIC COATED) ORAL
Status: DISCONTINUED | OUTPATIENT
Start: 2018-09-04 | End: 2018-09-06 | Stop reason: HOSPADM

## 2018-09-04 RX ADMIN — AMLODIPINE BESYLATE 5 MG: 5 TABLET ORAL at 09:09

## 2018-09-04 RX ADMIN — FERROUS SULFATE TAB EC 325 MG (65 MG FE EQUIVALENT) 325 MG: 325 (65 FE) TABLET DELAYED RESPONSE at 10:09

## 2018-09-04 RX ADMIN — ONDANSETRON 4 MG: 2 INJECTION, SOLUTION INTRAMUSCULAR; INTRAVENOUS at 09:09

## 2018-09-04 RX ADMIN — PANTOPRAZOLE SODIUM 40 MG: 40 TABLET, DELAYED RELEASE ORAL at 12:09

## 2018-09-04 RX ADMIN — FINASTERIDE 5 MG: 5 TABLET, FILM COATED ORAL at 09:09

## 2018-09-04 RX ADMIN — LOVASTATIN 40 MG: 20 TABLET ORAL at 08:09

## 2018-09-04 RX ADMIN — POLYETHYLENE GLYCOL 3350, SODIUM SULFATE ANHYDROUS, SODIUM BICARBONATE, SODIUM CHLORIDE, POTASSIUM CHLORIDE 4000 ML: 236; 22.74; 6.74; 5.86; 2.97 POWDER, FOR SOLUTION ORAL at 06:09

## 2018-09-04 RX ADMIN — DOXAZOSIN 8 MG: 2 TABLET ORAL at 10:09

## 2018-09-04 RX ADMIN — POTASSIUM CHLORIDE 20 MEQ: 40 SOLUTION ORAL at 12:09

## 2018-09-04 NOTE — ED PROVIDER NOTES
Encounter Date: 9/4/2018    SCRIBE #1 NOTE: Felice DOWLING am scribing for, and in the presence of, Dr. Batres.       History     Chief Complaint   Patient presents with    Rectal Bleeding     pt reports blood in stool everyday since fall on Friday      Time seen by provider: 4:27 AM    This is a 87 y.o. male who presents with complaint of rectal bleeding that x 3-4 days. Patient reports that he noticed scant amount of blood on his undergarment and bright red blood in the toilet after having a bowel movement. He states he woke up at 2 am this morning and had a bowel movement that was mostly diarrhea consisting of bright red blood. He denies bleeding from other areas. Patient reports falling off a six foot ladder onto concrete four days ago. He denies LOC, neck pain, back pain, or headache. He states that he was not evaluated by a physician s/p fall. He currently complies with Plavix. He notes that he was hospitalized in the past to receive a blood transfusion. He has not been experiencing fevers, chills, headaches, dizziness, light-headedness, weakness, congestion, rhinorrhea, sore throat, cough, SOB, chest pain, palpitations, abdominal pain, N/V/D, or urinary symptoms.         The history is provided by the patient (wife at bedside).     Review of patient's allergies indicates:   Allergen Reactions    Ciprofloxacin Nausea And Vomiting     Fever, chills, diarrhea    Benadryl [diphenhydramine hcl] Other (See Comments)     Unable to urinate     Past Medical History:   Diagnosis Date    Anticoagulant long-term use     Arthritis     BPH (benign prostatic hyperplasia)     Chronic back pain     with injections Dr. Ephraim Hightower (Pain Management)    Coronary artery disease     Hypertension     PVD (peripheral vascular disease)     SOB (shortness of breath)      Past Surgical History:   Procedure Laterality Date    CORONARY STENT PLACEMENT      EYE SURGERY      bilateral cataracts    KIDNEY STONE SURGERY       PROSTATECTOMY  7/2013    TURP    VASCULAR SURGERY       No family history on file.  Social History     Tobacco Use    Smoking status: Former Smoker    Smokeless tobacco: Former User     Quit date: 4/20/2010    Tobacco comment: quit    Substance Use Topics    Alcohol use: No    Drug use: No     Review of Systems   Constitutional: Negative for chills and fever.   HENT: Negative for congestion, rhinorrhea and sore throat.    Respiratory: Negative for cough and shortness of breath.    Cardiovascular: Negative for chest pain and palpitations.   Gastrointestinal: Positive for anal bleeding, blood in stool and diarrhea. Negative for abdominal pain, nausea and vomiting.   Genitourinary: Negative for decreased urine volume, difficulty urinating, dysuria, frequency and urgency.   Musculoskeletal: Negative for back pain and neck pain.   Skin: Negative for rash.   Neurological: Negative for dizziness, syncope, weakness, light-headedness and headaches.   Psychiatric/Behavioral: Negative for confusion.       Physical Exam     Initial Vitals [09/04/18 0417]   BP Pulse Resp Temp SpO2   (!) 197/87 78 18 97.7 °F (36.5 °C) 98 %      MAP       --         Physical Exam    Nursing note and vitals reviewed.  Constitutional: He appears well-developed and well-nourished. No distress.   HENT:   Head: Normocephalic and atraumatic.   Eyes: Conjunctivae and EOM are normal. Pupils are equal, round, and reactive to light.   Neck: Normal range of motion. Neck supple.   Cardiovascular: Normal rate, regular rhythm and normal heart sounds.   Pulmonary/Chest: Breath sounds normal. No respiratory distress.   Abdominal: Soft. He exhibits no distension. There is no tenderness.   Genitourinary:   Genitourinary Comments: Rectal exam: Maroon colored blood present. No stool. Chaperone present.    Musculoskeletal: Normal range of motion.   Neurological: He is alert and oriented to person, place, and time. He has normal strength. No cranial nerve  deficit.   Skin: Skin is warm and dry.   Ecchymosis present to left, thoracic, paraspinal region. Non tender.   Psychiatric: He has a normal mood and affect. His behavior is normal. Judgment and thought content normal.         ED Course   Procedures  Labs Reviewed   CBC W/ AUTO DIFFERENTIAL - Abnormal; Notable for the following components:       Result Value    RBC 4.20 (*)     Hemoglobin 11.9 (*)     Hematocrit 37.3 (*)     MCHC 31.9 (*)     RDW 14.6 (*)     All other components within normal limits   BASIC METABOLIC PANEL - Abnormal; Notable for the following components:    Potassium 3.0 (*)     Glucose 130 (*)     Calcium 8.3 (*)     eGFR if non  54 (*)     All other components within normal limits   PROTIME-INR   APTT   TYPE & SCREEN   PREPARE RBC SOFT          Imaging Results    None          Medical Decision Making:   Clinical Tests:   Lab Tests: Ordered and Reviewed  ED Management:  5:21 AM Case discussed with Dr. Soriano. Will admit the patient to his service. Recommends consult with GI. Wants to hold on Plavix.       Additional MDM:   Comments: 87-year-old male presents complaining of rectal bleeding that has been intermittent over the past 4 days.  Patient states that it was initially bright red only with bowel movements.  However, this morning he reports feeling the urge to have a bowel movement and seeing only blood mixed with watery stool.  On exam he had no stool but did have trace amount of maroonish colored blood present.  He is hemodynamically stable.  H&H is stable. Coags are within normal limits.  Patient will be admitted for observation, serial enzymes, and GI evaluation.  The case has been discussed with Dr. Soriano, the patient's cardiologist, who will admit the patient to his service.    5:52 AM  I walked in to discuss the results of his lab work and the patient stated that he had just returned from the bathroom and had a bloody bowel movement.  Patient was diaphoretic and  stated he could feel more blood coming out.  I re-examined him and he had no blood present on his skin or clothes.  He remains hemodynamically stable.  2 units of PRBCs have been typed and crossed..          Scribe Attestation:   Scribe #1: I performed the above scribed service and the documentation accurately describes the services I performed. I attest to the accuracy of the note.    Attending Attestation:           Physician Attestation for Scribe:  Physician Attestation Statement for Scribe #1: I, Dr. Batres, reviewed documentation, as scribed by Felice Rod  in my presence, and it is both accurate and complete.                    Clinical Impression:     1. Acute lower GI bleeding                                   Brenda Batres MD  09/04/18 0535       Brenda Batres MD  09/04/18 0554

## 2018-09-04 NOTE — CONSULTS
Gastroenterology Consult    9/4/2018 3:16 PM    Consulting Physician:  Deepti Ferrell MD  Primary Care Provider: Sandeep Richey Iii, MD    Reason for consultation: GI bleed    HPI:  Jem Pak is a 87 y.o. male with a history of arthritis, chronic back pain, BPH, HTN, PVD, and CAD on ASA/plavix who presents with rectal bleeding since a fall 3-4 days ago.  He fell off a 6 foot ladder.  He had diarrhea with bright red blood in the stool, and has continued to have bowel movements with blood (bright red) in them since being admitted to the hospital.  He does have a past history of anemia and underwent an EGD with duodenal AVMs in 2015.  He also reports seeing his GI physician at the beginning of this year and was told he didn't need any more screening/surveillance colonoscopies due to his age.  He denies any abdominal pain, N/V, hematemesis, or melena.  He denies chest pain, shortness of breath, dizziness or lightheadedness.  He thinks his last colonoscopy was within the last 5 years.  He denies NSAID use.    Past Medical History:   Diagnosis Date    Anticoagulant long-term use     Arthritis     BPH (benign prostatic hyperplasia)     Chronic back pain     with injections Dr. Ephraim Hightower (Pain Management)    Coronary artery disease     Hypertension     PVD (peripheral vascular disease)     SOB (shortness of breath)        Past Surgical History:   Procedure Laterality Date    CORONARY STENT PLACEMENT      EYE SURGERY      bilateral cataracts    KIDNEY STONE SURGERY      PROSTATECTOMY  7/2013    TURP    VASCULAR SURGERY         Social History     Socioeconomic History    Marital status: Single     Spouse name: None    Number of children: None    Years of education: None    Highest education level: None   Social Needs    Financial resource strain: None    Food insecurity - worry: None    Food insecurity - inability: None    Transportation needs - medical: None    Transportation needs -  "non-medical: None   Occupational History    None   Tobacco Use    Smoking status: Former Smoker    Smokeless tobacco: Former User     Quit date: 4/20/2010    Tobacco comment: quit    Substance and Sexual Activity    Alcohol use: No    Drug use: No    Sexual activity: None   Other Topics Concern    None   Social History Narrative    None       History reviewed. No pertinent family history.      Review of patient's allergies indicates:   Allergen Reactions    Ciprofloxacin Nausea And Vomiting     Fever, chills, diarrhea    Benadryl [diphenhydramine hcl] Other (See Comments)     Unable to urinate         Current Facility-Administered Medications:     amLODIPine tablet 5 mg, 5 mg, Oral, Daily, Brenda Batres MD, 5 mg at 09/04/18 0902    doxazosin tablet 8 mg, 8 mg, Oral, Daily with breakfast, Brenda Batres MD, 8 mg at 09/04/18 1002    ferrous sulfate EC tablet 325 mg, 325 mg, Oral, Daily with breakfast, Herbie Soriano MD, 325 mg at 09/04/18 1002    finasteride tablet 5 mg, 5 mg, Oral, Daily, Brenda Batres MD, 5 mg at 09/04/18 0902    lovastatin tablet 40 mg, 40 mg, Oral, QHS, Brenda Batres MD    pantoprazole EC tablet 40 mg, 40 mg, Oral, Daily, Herbie Soriano MD, 40 mg at 09/04/18 1212      Review of Systems   Constitutional: Negative for chills, fever and weight loss.   HENT: Negative.    Eyes: Negative.    Respiratory: Negative.    Cardiovascular: Negative.    Gastrointestinal: Positive for blood in stool and diarrhea. Negative for abdominal pain, constipation, heartburn, melena, nausea and vomiting.   Genitourinary: Negative.    Musculoskeletal: Negative.    Skin: Negative.    Neurological: Negative.    Endo/Heme/Allergies: Negative.          BP (!) 147/67 (BP Location: Right arm, Patient Position: Lying)   Pulse 63   Temp 98.6 °F (37 °C) (Oral)   Resp 18   Ht 5' 10" (1.778 m)   Wt 97 kg (213 lb 12.8 oz)   SpO2 98%   BMI 30.68 kg/m²   Physical Exam   Constitutional: He is oriented to " person, place, and time. He appears well-developed and well-nourished. No distress.   HENT:   Head: Normocephalic and atraumatic.   Mouth/Throat: Oropharynx is clear and moist.   Eyes: EOM are normal. Pupils are equal, round, and reactive to light. No scleral icterus.   Cardiovascular: Normal rate, regular rhythm and normal heart sounds.   No murmur heard.  Pulmonary/Chest: Effort normal and breath sounds normal. No respiratory distress.   Abdominal: Soft. Bowel sounds are normal. He exhibits no distension. There is no tenderness. There is no rebound and no guarding.   Musculoskeletal: Normal range of motion. He exhibits no edema.   Neurological: He is alert and oriented to person, place, and time.   Skin: Skin is warm and dry. No rash noted.   Vitals reviewed.      Labs:  Lab Results   Component Value Date/Time    WBC 6.32 09/04/2018 11:33 AM    HGB 10.8 (L) 09/04/2018 11:33 AM    HCT 34.1 (L) 09/04/2018 11:33 AM    HCT 37 07/11/2013 06:17 AM     09/04/2018 11:33 AM    MCV 89 09/04/2018 11:33 AM     09/04/2018 04:39 AM    K 3.0 (L) 09/04/2018 04:39 AM     09/04/2018 04:39 AM    CO2 23 09/04/2018 04:39 AM    BUN 19 09/04/2018 04:39 AM    CREATININE 1.2 09/04/2018 04:39 AM     (H) 09/04/2018 04:39 AM    CALCIUM 8.3 (L) 09/04/2018 04:39 AM    INR 0.9 09/04/2018 04:39 AM    APTT 23.8 09/04/2018 04:39 AM   ]  Lab Results   Component Value Date/Time    PROT 7.0 05/31/2017 09:09 AM    ALBUMIN 3.6 05/31/2017 09:09 AM    BILITOT 0.4 05/31/2017 09:09 AM    AST 21 05/31/2017 09:09 AM    ALT 25 05/31/2017 09:09 AM    ALKPHOS 81 05/31/2017 09:09 AM   ]    Imaging and Procedures:  I personally reviewed the imaging/procedures below.  EGD 12/5/13:  - Hiatus hernia.  - Normal stomach.  - A few non-bleeding angioectasias in the duodenum.    Assessment:  Jem Pak is a 87 y.o. male with a history of arthritis, chronic back pain, BPH, HTN, PVD, and CAD on ASA/plavix who presents with rectal bleeding  since a fall 3-4 days ago.      Plan:  - colonoscopy tomorrow to evaluate for source of bleeding - likely diverticular v. AVM - will treat if active bleeding identified  - clear liquid diet for the rest of the day, NPO after midnight  - golytely prep tonight and tomorrow morning  - hold plavix for now    Deepti Ferrell MD

## 2018-09-04 NOTE — ED TRIAGE NOTES
"Pt c/o rectal bleeding x4 days, described as "gushing". Pt states " the first time it happened it was bright red. I doubled up on my iron and it has been darker. This morning around 130 I went to use the bathroom and blood started gushing out. I also have been having diahrera. " pt c/o weakness/dizziness but denies fever, chills, n/v, abdominal pain, sob, cp, dysuria.  Pt has hx of rectal bleeding and blood transfusion. Pt states " I recently fell off of a 4 foot ladder on Thursday and ever since then I have been having bleeding. "  "

## 2018-09-04 NOTE — PLAN OF CARE
CM met with pt and family at bedside for initial discharge planning assessment.    Pt confirms his PCP is  and is correct in Epic.    Pt's pharmacy of choice is [facility] on [address]    Pt has no psyc history, uses no DME and is independent.    Pt states he will have no CM needs for discharge.    CM to continue to follow for any plans and arrangements that may arise.     09/04/18 1426   Discharge Assessment   Assessment Type Discharge Planning Assessment   Confirmed/corrected address and phone number on facesheet? Yes   Assessment information obtained from? Patient;Caregiver;Medical Record   Expected Length of Stay (days) 2   Communicated expected length of stay with patient/caregiver yes   Prior to hospitilization cognitive status: Alert/Oriented   Prior to hospitalization functional status: Independent   Current cognitive status: Alert/Oriented   Current Functional Status: Independent   Lives With spouse   Able to Return to Prior Arrangements yes   Is patient able to care for self after discharge? Yes   Patient's perception of discharge disposition home or selfcare   Readmission Within The Last 30 Days no previous admission in last 30 days   Patient currently being followed by outpatient case management? No   Patient currently receives any other outside agency services? No   Equipment Currently Used at Home none   Do you have any problems affording any of your prescribed medications? No   Is the patient taking medications as prescribed? yes   Does the patient have transportation home? Yes   Transportation Available car;family or friend will provide   Does the patient receive services at the [facility]? No   Discharge Plan A Home   Discharge Plan B Home   Patient/Family In Agreement With Plan yes

## 2018-09-05 ENCOUNTER — ANESTHESIA EVENT (OUTPATIENT)
Dept: ENDOSCOPY | Facility: OTHER | Age: 83
End: 2018-09-05
Payer: MEDICARE

## 2018-09-05 ENCOUNTER — ANESTHESIA (OUTPATIENT)
Dept: ENDOSCOPY | Facility: OTHER | Age: 83
End: 2018-09-05
Payer: MEDICARE

## 2018-09-05 LAB
ANION GAP SERPL CALC-SCNC: 10 MMOL/L
BASOPHILS # BLD AUTO: 0.01 K/UL
BASOPHILS NFR BLD: 0.2 %
BUN SERPL-MCNC: 15 MG/DL
CALCIUM SERPL-MCNC: 8.1 MG/DL
CHLORIDE SERPL-SCNC: 110 MMOL/L
CO2 SERPL-SCNC: 25 MMOL/L
CREAT SERPL-MCNC: 0.9 MG/DL
DIFFERENTIAL METHOD: ABNORMAL
EOSINOPHIL # BLD AUTO: 0.1 K/UL
EOSINOPHIL NFR BLD: 1.5 %
ERYTHROCYTE [DISTWIDTH] IN BLOOD BY AUTOMATED COUNT: 14.7 %
EST. GFR  (AFRICAN AMERICAN): >60 ML/MIN/1.73 M^2
EST. GFR  (NON AFRICAN AMERICAN): >60 ML/MIN/1.73 M^2
GLUCOSE SERPL-MCNC: 110 MG/DL
HCT VFR BLD AUTO: 29.6 %
HGB BLD-MCNC: 9.4 G/DL
LYMPHOCYTES # BLD AUTO: 1.2 K/UL
LYMPHOCYTES NFR BLD: 18.7 %
MCH RBC QN AUTO: 28.1 PG
MCHC RBC AUTO-ENTMCNC: 31.8 G/DL
MCV RBC AUTO: 88 FL
MONOCYTES # BLD AUTO: 0.4 K/UL
MONOCYTES NFR BLD: 6.5 %
NEUTROPHILS # BLD AUTO: 4.5 K/UL
NEUTROPHILS NFR BLD: 73.1 %
PLATELET # BLD AUTO: 138 K/UL
PMV BLD AUTO: 11.8 FL
POTASSIUM SERPL-SCNC: 3.3 MMOL/L
RBC # BLD AUTO: 3.35 M/UL
SODIUM SERPL-SCNC: 145 MMOL/L
WBC # BLD AUTO: 6.16 K/UL

## 2018-09-05 PROCEDURE — 37000008 HC ANESTHESIA 1ST 15 MINUTES: Performed by: INTERNAL MEDICINE

## 2018-09-05 PROCEDURE — G0378 HOSPITAL OBSERVATION PER HR: HCPCS

## 2018-09-05 PROCEDURE — 63600175 PHARM REV CODE 636 W HCPCS: Performed by: NURSE ANESTHETIST, CERTIFIED REGISTERED

## 2018-09-05 PROCEDURE — 36415 COLL VENOUS BLD VENIPUNCTURE: CPT

## 2018-09-05 PROCEDURE — 25000003 PHARM REV CODE 250: Performed by: EMERGENCY MEDICINE

## 2018-09-05 PROCEDURE — 80048 BASIC METABOLIC PNL TOTAL CA: CPT

## 2018-09-05 PROCEDURE — 25000003 PHARM REV CODE 250: Performed by: NURSE ANESTHETIST, CERTIFIED REGISTERED

## 2018-09-05 PROCEDURE — 37000009 HC ANESTHESIA EA ADD 15 MINS: Performed by: INTERNAL MEDICINE

## 2018-09-05 PROCEDURE — 88305 TISSUE EXAM BY PATHOLOGIST: CPT | Performed by: PATHOLOGY

## 2018-09-05 PROCEDURE — 45380 COLONOSCOPY AND BIOPSY: CPT | Performed by: INTERNAL MEDICINE

## 2018-09-05 PROCEDURE — 85025 COMPLETE CBC W/AUTO DIFF WBC: CPT

## 2018-09-05 PROCEDURE — 94761 N-INVAS EAR/PLS OXIMETRY MLT: CPT

## 2018-09-05 PROCEDURE — 88305 TISSUE EXAM BY PATHOLOGIST: CPT | Mod: 26,,, | Performed by: PATHOLOGY

## 2018-09-05 PROCEDURE — 25000003 PHARM REV CODE 250: Performed by: INTERNAL MEDICINE

## 2018-09-05 PROCEDURE — 27201012 HC FORCEPS, HOT/COLD, DISP: Performed by: INTERNAL MEDICINE

## 2018-09-05 RX ORDER — SODIUM CHLORIDE, SODIUM LACTATE, POTASSIUM CHLORIDE, CALCIUM CHLORIDE 600; 310; 30; 20 MG/100ML; MG/100ML; MG/100ML; MG/100ML
INJECTION, SOLUTION INTRAVENOUS CONTINUOUS PRN
Status: DISCONTINUED | OUTPATIENT
Start: 2018-09-05 | End: 2018-09-05

## 2018-09-05 RX ORDER — LIDOCAINE HCL/PF 100 MG/5ML
SYRINGE (ML) INTRAVENOUS
Status: DISCONTINUED | OUTPATIENT
Start: 2018-09-05 | End: 2018-09-05

## 2018-09-05 RX ORDER — PROPOFOL 10 MG/ML
VIAL (ML) INTRAVENOUS
Status: DISCONTINUED | OUTPATIENT
Start: 2018-09-05 | End: 2018-09-05

## 2018-09-05 RX ORDER — CLOPIDOGREL BISULFATE 75 MG/1
75 TABLET ORAL DAILY
Status: DISCONTINUED | OUTPATIENT
Start: 2018-09-05 | End: 2018-09-05

## 2018-09-05 RX ADMIN — LOVASTATIN 40 MG: 20 TABLET ORAL at 08:09

## 2018-09-05 RX ADMIN — SODIUM CHLORIDE, SODIUM LACTATE, POTASSIUM CHLORIDE, AND CALCIUM CHLORIDE: 600; 310; 30; 20 INJECTION, SOLUTION INTRAVENOUS at 07:09

## 2018-09-05 RX ADMIN — PROPOFOL 10 MG: 10 INJECTION, EMULSION INTRAVENOUS at 08:09

## 2018-09-05 RX ADMIN — PROPOFOL 20 MG: 10 INJECTION, EMULSION INTRAVENOUS at 08:09

## 2018-09-05 RX ADMIN — PANTOPRAZOLE SODIUM 40 MG: 40 TABLET, DELAYED RELEASE ORAL at 10:09

## 2018-09-05 RX ADMIN — FERROUS SULFATE TAB EC 325 MG (65 MG FE EQUIVALENT) 325 MG: 325 (65 FE) TABLET DELAYED RESPONSE at 10:09

## 2018-09-05 RX ADMIN — PROPOFOL 100 MG: 10 INJECTION, EMULSION INTRAVENOUS at 07:09

## 2018-09-05 RX ADMIN — AMLODIPINE BESYLATE 5 MG: 5 TABLET ORAL at 10:09

## 2018-09-05 RX ADMIN — DOXAZOSIN 8 MG: 2 TABLET ORAL at 10:09

## 2018-09-05 RX ADMIN — LIDOCAINE HYDROCHLORIDE 50 MG: 20 INJECTION, SOLUTION INTRAVENOUS at 07:09

## 2018-09-05 RX ADMIN — FINASTERIDE 5 MG: 5 TABLET, FILM COATED ORAL at 10:09

## 2018-09-05 NOTE — OR NURSING
Pt without change from previous assessment. Prepared for transfer to inpt room 306. Report by phone to Sunny PAULINO. Pt placed on transport.

## 2018-09-05 NOTE — PLAN OF CARE
Problem: Patient Care Overview  Goal: Plan of Care Review  Outcome: Ongoing (interventions implemented as appropriate)  Pt remained free of falls and injuries. Cardiac diet. VSS on room air. IV to the left AC, flushed and saline locked. No complaints of pain. Ambulates to the bathroom with assistance. Bed low and locked, call light within reach, side rails up x2. Will continue to monitor.

## 2018-09-05 NOTE — [employer] PATIENT INSTRUCTIONS
Discharge Summary/Instructions after an Endoscopic Procedure  Patient Name: Jem Pak  Patient MRN: [id number]  Patient YOB: 1930 Wednesday, September 05, 2018  Deepti Ferrell MD  RESTRICTIONS:  During your procedure today, you received medications for sedation.  These   medications may affect your judgment, balance and coordination.  Therefore,   for 24 hours, you have the following restrictions:   - DO NOT drive a car, operate machinery, make legal/financial decisions,   sign important papers or drink alcohol.    ACTIVITY:  Today: no heavy lifting, straining or running due to procedural   sedation/anesthesia.  The following day: return to full activity including work.  DIET:  Eat and drink normally unless instructed otherwise.     TREATMENT FOR COMMON SIDE EFFECTS:  - Mild abdominal pain, nausea, belching, bloating or excessive gas:  rest,   eat lightly and use a heating pad.  - Sore Throat: treat with throat lozenges and/or gargle with warm salt   water.  - Because air was used during the procedure, expelling large amounts of air   from your rectum or belching is normal.  - If a bowel prep was taken, you may not have a bowel movement for 1-3 days.    This is normal.  SYMPTOMS TO WATCH FOR AND REPORT TO YOUR PHYSICIAN:  1. Abdominal pain or bloating, other than gas cramps.  2. Chest pain.  3. Back pain.  4. Signs of infection such as: chills or fever occurring within 24 hours   after the procedure.  5. Rectal bleeding, which would show as bright red, maroon, or black stools.   (A tablespoon of blood from the rectum is not serious, especially if   hemorrhoids are present.)  6. Vomiting.  7. Weakness or dizziness.  GO DIRECTLY TO THE NEAREST EMERGENCY ROOM IF YOU HAVE ANY OF THE FOLLOWING:      Difficulty breathing              Chills and/or fever over 101 F   Persistent vomiting and/or vomiting blood   Severe abdominal pain   Severe chest pain   Black, tarry stools   Bleeding- more than one  tablespoon   Any other symptom or condition that you feel may need urgent attention  Your doctor recommends these additional instructions:  If any biopsies were taken, your doctors clinic will contact you in 1 to 2   weeks with any results.  - Return patient to hospital tsoner for ongoing care.   - Resume previous diet today.   - Resume Plavix (clopidogrel) at prior dose today. If he rebleeds, consider   CTA and IR evaluation for embolization if an active bleeding source is   identified.  - Await pathology results.  For questions, problems or results please call your physician - Deepti Ferrell MD at Work:  [phone].  [facility], EMERGENCY ROOM PHONE NUMBER: [phone], [facility]   [phone].  IF A COMPLICATION OR EMERGENCY SITUATION ARISES AND YOU ARE UNABLE TO REACH   YOUR PHYSICIAN - GO DIRECTLY TO THE EMERGENCY ROOM.  Deepti Ferrell MD  9/5/2018 8:34:33 AM  This report has been verified and signed electronically.  [employer]

## 2018-09-05 NOTE — ANESTHESIA POSTPROCEDURE EVALUATION
"Anesthesia Post Evaluation    Patient: Jem Pak    Procedure(s) Performed: Procedure(s) (LRB):  COLONOSCOPY (N/A)  POLYPECTOMY (N/A)    Final Anesthesia Type: general  Patient location during evaluation: PACU  Patient participation: Yes- Able to Participate  Level of consciousness: oriented and awake  Post-procedure vital signs: reviewed and stable  Pain management: adequate  Airway patency: patent  PONV status at discharge: No PONV  Anesthetic complications: no      Cardiovascular status: hemodynamically stable  Respiratory status: unassisted, spontaneous ventilation and room air  Hydration status: euvolemic  Follow-up not needed.        Visit Vitals  BP (!) 160/70   Pulse 60   Temp 36.9 °C (98.4 °F) (Oral)   Resp 16   Ht 5' 10" (1.778 m)   Wt 97 kg (213 lb 12.8 oz)   SpO2 98%   BMI 30.68 kg/m²       Pain/Fay Score: Pain Assessment Performed: Yes (9/5/2018  7:10 AM)  Presence of Pain: denies (9/5/2018  9:04 AM)  Fay Score: 9 (9/5/2018  9:04 AM)        "

## 2018-09-05 NOTE — PLAN OF CARE
Problem: Patient Care Overview  Goal: Plan of Care Review  Outcome: Ongoing (interventions implemented as appropriate)  Plan of care reviewed with patient.  Patient NPO, and drank 75% of colon prep.  Patient continues to have bright red liquid stool.  Patient denies pain, PRN zofran effective for nausea.  Patient ambulates with stand by assist, he admits to weakness but denies feeling lightheaded or dizzy.  Purposeful rounding completed, call bell within reach, no needs at this time.  Will continue to monitor.

## 2018-09-05 NOTE — PLAN OF CARE
Problem: Patient Care Overview  Goal: Plan of Care Review  Outcome: Ongoing (interventions implemented as appropriate)  98% saturation on room air.

## 2018-09-05 NOTE — ANESTHESIA PREPROCEDURE EVALUATION
09/05/2018  Jem Pak is a 87 y.o., male.    Anesthesia Evaluation    I have reviewed the Patient Summary Reports.    I have reviewed the Nursing Notes.   I have reviewed the Medications.     Review of Systems  Anesthesia Hx:  No problems with previous Anesthesia  Denies Family Hx of Anesthesia complications.   Denies Personal Hx of Anesthesia complications.   Social:  Former Smoker    Hematology/Oncology:         -- Anemia:   Cardiovascular:   Hypertension CAD  CABG/stent  PVD Coronary stents in past likely not patent per pt   Pulmonary:   Shortness of breath    Renal/:  Renal/ Normal     Hepatic/GI:   GI bleed   Musculoskeletal:   Arthritis  spinal stenosis   Neurological:  Neurology Normal    Endocrine:  Endocrine Normal        Physical Exam  General:  Well nourished    Airway/Jaw/Neck:  Airway Findings: Mouth Opening: Normal Tongue: Normal  General Airway Assessment: Adult  Mallampati: II         Dental:  Dental Findings: upper front caps             Anesthesia Plan  Type of Anesthesia, risks & benefits discussed:  Anesthesia Type:  general  Patient's Preference:   Intra-op Monitoring Plan: standard ASA monitors  Intra-op Monitoring Plan Comments:   Post Op Pain Control Plan: per primary service following discharge from PACU  Post Op Pain Control Plan Comments:   Induction:    Beta Blocker:         Informed Consent: Patient understands risks and agrees with Anesthesia plan.  Questions answered. Anesthesia consent signed with patient.  ASA Score: 3     Day of Surgery Review of History & Physical:    H&P update referred to the surgeon.         Ready For Surgery From Anesthesia Perspective.

## 2018-09-05 NOTE — TRANSFER OF CARE
"Anesthesia Transfer of Care Note    Patient: Jem Pak    Procedure(s) Performed: Procedure(s) (LRB):  COLONOSCOPY (N/A)    Patient location: PACU    Anesthesia Type: general    Transport from OR: Transported from OR on 2-3 L/min O2 by NC with adequate spontaneous ventilation    Post pain: adequate analgesia    Post assessment: no apparent anesthetic complications and tolerated procedure well    Post vital signs: stable    Level of consciousness: awake and alert    Nausea/Vomiting: no nausea/vomiting    Complications: none    Transfer of care protocol was followed      Last vitals:   Visit Vitals  BP (!) 162/75 (BP Location: Right arm, Patient Position: Lying)   Pulse 63   Temp 36.9 °C (98.5 °F) (Oral)   Resp 18   Ht 5' 10" (1.778 m)   Wt 97 kg (213 lb 12.8 oz)   SpO2 97%   BMI 30.68 kg/m²     "

## 2018-09-05 NOTE — H&P
HISTORY OF PRESENT ILLNESS:  Mr. Pak is an 87-year-old gentleman who   presented to the Emergency Room here with complaints of rectal bleeding.  He   says that he was in his usual state of good health, when he climbed a ladder on   Thursday, he says it was a 6 feet ladder, it fell and he landed, he said it was   a hard fall; however, he did not apparently sustain any major injuries.  He said   the following morning, he awakened and had a bloody bowel movement with bright   red blood.  He said over the subsequent day or two, he has had bright red blood   with each bowel movement.  He was awakened at 2:00 a.m. this morning with bright   red blood in the stools and had several bloody diarrheas, he says he cannot   count how many times, since then and that brought him to the hospital early this   morning.  There was no fever.  There was no nausea or vomiting.  He apparently   came to the Emergency Room here with hematemesis in May 2017, was seen in the   Emergency Room, and sent home.    PAST MEDICAL HISTORY:  He has been troubled with intractable back pain, has had   several epidural steroid injections with partial benefit.  He has had stable   coronary artery disease and peripheral vascular disease.  He has had   longstanding hypertension.  He has had previous coronary artery stent placement,   has had bilateral cataract surgeries, has had previous nephrolithiasis, has had   prostatectomy.  He is an ex-cigarette smoker, quit smoking in 2010.  Does not   drink alcohol or use illicit drugs.    HOME MEDICATIONS:  Include Plavix, Lodine, aspirin, prednisone, Zofran, saw   palmetto, Bactrim, and p.r.n. temazepam, doxazosin, finasteride, lovastatin,   amlodipine, and ferrous sulfate.    PHYSICAL EXAMINATION:  GENERAL:  Reveals an alert, pleasant man, in no apparent acute distress.  VITAL SIGNS:  Blood pressure of 147/67 and a pulse of 63 beats per minute.  HEENT:  The sclerae is nonicteric.  The conjunctivae pink.  The  ENT exam is   unremarkable.  NECK:  Supple.  There is no jugular venous distention.  The carotid upstroke is   brisk.  There is no carotid bruit.  CHEST:  Clear.  HEART:  Size is grossly normal.  S1 and S2 are normal.  There is no audible   murmur or gallop.  ABDOMEN:  Soft and nontender.  The bowel sounds are normal.  EXTREMITIES:  There is no clubbing, cyanosis or edema of feet.  NEUROLOGIC:  Grossly, the neurological exam is intact.    IMPRESSION ON ADMISSION:  1.  Lower GI bleeding.  2.  Coronary artery disease.  3.  Peripheral vascular disease.  4.  Essential hypertension.  5.  Ex-cigarette smoker.      SB/HN  dd: 09/04/2018 11:09:55 (CDT)  td: 09/04/2018 20:22:14 (CDT)  Doc ID   #[id number]  Job ID #[id number]    CC:

## 2018-09-06 VITALS
WEIGHT: 213.81 LBS | HEART RATE: 69 BPM | HEIGHT: 70 IN | BODY MASS INDEX: 30.61 KG/M2 | TEMPERATURE: 98 F | SYSTOLIC BLOOD PRESSURE: 152 MMHG | RESPIRATION RATE: 18 BRPM | DIASTOLIC BLOOD PRESSURE: 81 MMHG | OXYGEN SATURATION: 95 %

## 2018-09-06 LAB
ANION GAP SERPL CALC-SCNC: 9 MMOL/L
BASOPHILS # BLD AUTO: 0.02 K/UL
BASOPHILS NFR BLD: 0.3 %
BUN SERPL-MCNC: 13 MG/DL
CALCIUM SERPL-MCNC: 8.2 MG/DL
CHLORIDE SERPL-SCNC: 108 MMOL/L
CO2 SERPL-SCNC: 27 MMOL/L
CREAT SERPL-MCNC: 1 MG/DL
DIFFERENTIAL METHOD: ABNORMAL
EOSINOPHIL # BLD AUTO: 0.2 K/UL
EOSINOPHIL NFR BLD: 3 %
ERYTHROCYTE [DISTWIDTH] IN BLOOD BY AUTOMATED COUNT: 14.7 %
EST. GFR  (AFRICAN AMERICAN): >60 ML/MIN/1.73 M^2
EST. GFR  (NON AFRICAN AMERICAN): >60 ML/MIN/1.73 M^2
GLUCOSE SERPL-MCNC: 105 MG/DL
HCT VFR BLD AUTO: 27.7 %
HGB BLD-MCNC: 8.7 G/DL
LYMPHOCYTES # BLD AUTO: 1.2 K/UL
LYMPHOCYTES NFR BLD: 20.6 %
MCH RBC QN AUTO: 27.8 PG
MCHC RBC AUTO-ENTMCNC: 31.4 G/DL
MCV RBC AUTO: 89 FL
MONOCYTES # BLD AUTO: 0.6 K/UL
MONOCYTES NFR BLD: 10.2 %
NEUTROPHILS # BLD AUTO: 3.9 K/UL
NEUTROPHILS NFR BLD: 65.7 %
PLATELET # BLD AUTO: 135 K/UL
PMV BLD AUTO: 11.9 FL
POTASSIUM SERPL-SCNC: 3.4 MMOL/L
RBC # BLD AUTO: 3.13 M/UL
SODIUM SERPL-SCNC: 144 MMOL/L
WBC # BLD AUTO: 5.91 K/UL

## 2018-09-06 PROCEDURE — 94761 N-INVAS EAR/PLS OXIMETRY MLT: CPT

## 2018-09-06 PROCEDURE — 85025 COMPLETE CBC W/AUTO DIFF WBC: CPT

## 2018-09-06 PROCEDURE — 25000003 PHARM REV CODE 250: Performed by: EMERGENCY MEDICINE

## 2018-09-06 PROCEDURE — 36415 COLL VENOUS BLD VENIPUNCTURE: CPT

## 2018-09-06 PROCEDURE — G0378 HOSPITAL OBSERVATION PER HR: HCPCS

## 2018-09-06 PROCEDURE — 25000003 PHARM REV CODE 250: Performed by: INTERNAL MEDICINE

## 2018-09-06 PROCEDURE — 80048 BASIC METABOLIC PNL TOTAL CA: CPT

## 2018-09-06 RX ADMIN — FERROUS SULFATE TAB EC 325 MG (65 MG FE EQUIVALENT) 325 MG: 325 (65 FE) TABLET DELAYED RESPONSE at 09:09

## 2018-09-06 RX ADMIN — DOXAZOSIN 8 MG: 2 TABLET ORAL at 09:09

## 2018-09-06 RX ADMIN — AMLODIPINE BESYLATE 5 MG: 5 TABLET ORAL at 09:09

## 2018-09-06 RX ADMIN — FINASTERIDE 5 MG: 5 TABLET, FILM COATED ORAL at 09:09

## 2018-09-06 RX ADMIN — PANTOPRAZOLE SODIUM 40 MG: 40 TABLET, DELAYED RELEASE ORAL at 09:09

## 2018-09-06 NOTE — DISCHARGE INSTRUCTIONS
Thank you for choosing [facility] as your Health Care Provider. [facility] strives to provide the best healthcare available to you. In the next few days you may receive a Survey, either by mail or email,  asking you to rate our care that was provided to you during your stay.  Please return the survey to us, as your feedback is important. We aim to meet your expectations of safe, quality health care.      When You Have Gastrointestinal (GI) Bleeding    Blood in your vomit or stool can be a sign of gastrointestinal (GI) bleeding. GI bleeding can be scary. But the cause may not be serious. You should always see a doctor if GI bleeding occurs.  The GI tract  The GI tract is the path through which food travels in the body. Food passes from the mouth down the esophagus (the tube from the mouth to the stomach). Food begins to break down in the stomach. It then moves through the duodenum, the first part of the small intestine. Nutrients are absorbed as food travels through the small intestine. What is left passes into the colon (large intestine) as waste. The colon removes water from the waste. Waste continues from the colon to the rectum (where stool is stored). Waste then leaves the body through the anus.  Causes of GI bleeding  GI bleeding can be caused by many different problems. Some of the more common causes include:  · Swollen veins in the anus (hemorrhoids)  · Swollen veins in the esophagus (varices)  · Sore on the lining of the GI tract (ulcer)  · Cuts or scrapes in the mouth or throat  · Infection caused by germs such as bacteria or parasites  · Food allergies, such as milk allergy in young children  · Medicines  · Inflammation of the GI tract (gastritis or esophagitis)  · Colitis (Crohn's disease or ulcerative colitis)  · Cancer (tumors or polyps)  · Abnormal pouches in the colon (diverticula)  · Tears in the esophagus or anus  · Nosebleed  · Abnormal blood vessels in the GI tract  (angiodysplasia)  Diagnosing the cause of blood in stool  If blood is coming out in your stool, you may have a lower GI tract problem or a very fast upper GI tract bleed. Bleeding from the GI tract can be bright red. Or it may look dark and tarry. Tests may also find blood in your stool that cant be seen with the eye (occult blood). To find out the cause, tests that may be ordered include:  · Blood tests. A blood sample is taken and sent to a lab for exam.  · Hemoccult test. Checks a stool sample for blood.  · Stool culture. Checks a stool sample for bacteria or parasites.  · X-ray, ultrasound, or CT scan. Imaging tests that take pictures of the digestive tract.  · Colonoscopy or sigmoidoscopy. This test uses a flexible tube with a tiny camera. The tube is inserted through your anus into your rectum to see the inside of your colon. Your provider can also take a tiny tissue sample (biopsy) and treat a bleeding source  Diagnosing the cause of blood in vomit  If you are vomiting blood or something that looks like coffee grounds, you may have an upper GI tract problem. To find the cause, tests that may be done include:  · Upper Endoscopy. A flexible tube with a tiny camera is inserted through your mouth and throat to see inside your upper GI tract. This lets your provider take a tiny tissue sample (biopsy) and treat a bleeding source.  · Nasogastric lavage. This can tell if you have upper GI or lower GI bleeding.  · X-ray, ultrasound, or CT scan. Imaging tests that take pictures of your digestive tract.  · Upper GI series. X-rays of the upper part of your GI tract taken from inside your body.  · Enteroscopy. This sends a flexible tube or a small, swallowed capsule camera into your small intestine.  When to call your healthcare provider  Call your healthcare provider right away if you have any of the following:  · Bleeding from your mouth or anus that can't be stopped  · Fever of 100.4°F (38.0°) or higher  · Bleeding  along with feeling lightheaded or dizzy  · Signs of fluid loss (dehydration). These include a dry, sticky mouth, decreased urine output; and very dark urine.  · Belly (abdominal) pain   Date Last Reviewed: 7/1/2016  © 8238-7658 [employer]. [address] [place] [address]. All rights reserved. This information is not intended as a substitute for professional medical care. Always follow your healthcare professional's instructions.

## 2018-09-06 NOTE — PLAN OF CARE
Pt is discharging home today. Family to transport home.    Pt confirms no CM needs or barriers for discharge.     09/06/18 1304   Final Note   Assessment Type Final Discharge Note   What phone number can be called within the next 1-3 days to see how you are doing after discharge? [phone]   Hospital Follow Up  Appt(s) scheduled? Yes   Discharge plans and expectations educations in teach back method with documentation complete? Yes   Right Care Referral Info   Post Acute Recommendation No Care

## 2018-09-06 NOTE — PLAN OF CARE
Problem: Patient Care Overview  Goal: Plan of Care Review  Outcome: Ongoing (interventions implemented as appropriate)  Patient is free from fall and injury. AAOx3 and sating good in room air. Meds given, needs attended. Resting comfortably in bed. Call light within patients reach.

## 2018-09-17 NOTE — DISCHARGE SUMMARY
HISTORY OF PRESENT ILLNESS:  Mr. Pak is an 88-year-old gentleman who   presented to the Emergency Room with complaints of rectal bleeding.  He says he   fell off a ladder the day prior to admission.  The morning of admission, he   awakened and had a bloody bowel movement with what he said was a large amount of   bright red blood.  Over the subsequent day, he had bright red blood with each   bowel movement.  On 09/04/2018, he awakened at 2:00 in the morning with again   bright red blood and had several bloody diarrhea as he says he could not count   the number of times he had to rush to the bathroom to have a bowel movement.    This prompted him coming to the Emergency Room for further evaluation and   treatment.  In May 2017, he had what he describes as hematemesis, was seen in   the Emergency Room and was sent home.  In the past, he has had coronary artery   disease, peripheral vascular disease, longstanding hypertension, has had   nephrolithiasis, and cataract surgery.  He is an ex-smoker.  Does not drink   alcohol or use drugs.  His physical examination was unremarkable.    IMPRESSION ON ADMISSION:  1.  Lower GI bleeding.  2.  Coronary artery disease.  3.  Peripheral vascular disease.  4.  Essential hypertension.  5.  Ex-cigarette smoker.    He was admitted to the hospital, follow up H and H was done.  The BMP showed   potassium level that was 3.0.  The followup CBC showed hematocrit of 29.6 and   then 27.7.  A consultation was obtained from GI and he was seen initially by Dr. Deepti Ferrell, underwent colonoscopy.  Their recommendation was that the source   of the GI bleeding was from diverticulosis.  There was apparently no fresh GI   blood loss, and the recommendation was to discharge for further outpatient   followup.  The Plavix was discontinued.  He was asked to continue his aspirin,   he would continue his aspirin 81 mg, Cardura, Proscar, lovastatin, amlodipine,   ferrous sulfate.  He was to see   Guider for followup and have a followup CBC   in a week.  He will remain under the primary care of Dr. Sandeep Richey and see   Dr. Richey for followup in a couple of weeks.    FINAL DIAGNOSES:  1.  Lower gastrointestinal bleeding from diverticulosis.  2.  Coronary artery disease.  3.  Essential hypertension.  4.  Peripheral vascular disease.  5.  Ex-cigarette smoker.      OMAR/JUS  dd: 09/16/2018 17:07:17 (CDT)  td: 09/17/2018 02:36:21 (CDT)  Doc ID   #[id number]  Job ID #[id number]    CC:

## 2018-09-20 ENCOUNTER — OFFICE VISIT (OUTPATIENT)
Dept: CARDIOLOGY | Facility: CLINIC | Age: 83
End: 2018-09-20
Attending: INTERNAL MEDICINE
Payer: MEDICARE

## 2018-09-20 VITALS
HEIGHT: 70 IN | BODY MASS INDEX: 31.07 KG/M2 | DIASTOLIC BLOOD PRESSURE: 69 MMHG | WEIGHT: 217 LBS | SYSTOLIC BLOOD PRESSURE: 148 MMHG | HEART RATE: 75 BPM

## 2018-09-20 DIAGNOSIS — K92.2 ACUTE LOWER GI BLEEDING: ICD-10-CM

## 2018-09-20 DIAGNOSIS — I25.10 CORONARY ARTERY DISEASE INVOLVING NATIVE CORONARY ARTERY OF NATIVE HEART WITHOUT ANGINA PECTORIS: Primary | ICD-10-CM

## 2018-09-20 DIAGNOSIS — Z87.891 EX-SMOKER: ICD-10-CM

## 2018-09-20 DIAGNOSIS — I10 ESSENTIAL HYPERTENSION: ICD-10-CM

## 2018-09-20 DIAGNOSIS — M48.062 SPINAL STENOSIS, LUMBAR REGION, WITH NEUROGENIC CLAUDICATION: ICD-10-CM

## 2018-09-20 DIAGNOSIS — D50.0 IRON DEFICIENCY ANEMIA DUE TO CHRONIC BLOOD LOSS: ICD-10-CM

## 2018-09-20 DIAGNOSIS — I73.9 PAD (PERIPHERAL ARTERY DISEASE): ICD-10-CM

## 2018-09-20 PROCEDURE — 99213 OFFICE O/P EST LOW 20 MIN: CPT | Mod: S$GLB,,, | Performed by: INTERNAL MEDICINE

## 2018-09-20 PROCEDURE — 93000 ELECTROCARDIOGRAM COMPLETE: CPT | Mod: S$GLB,,, | Performed by: INTERNAL MEDICINE

## 2018-09-20 NOTE — PROGRESS NOTES
Subjective:    Patient ID:  Jem Pak is a 88 y.o. 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    I feel well.  I am now off clopidogrel.  I recently had blood work done at Dr. Ricehy's office.  I take a stool softener daily.  I have had no further rectal bleeding.  I have had some burning upon urination, and I am due to see Dr. Mary Lou georges.    Current Outpatient Medications   Medication Sig    amlodipine (NORVASC) 5 MG tablet Take 5 mg by mouth once daily.    aspirin (ECOTRIN) 81 MG EC tablet Take 81 mg by mouth once daily.    doxazosin (CARDURA XL) 8 mg 24 hr tablet Take 8 mg by mouth daily with breakfast.    etodolac (LODINE) 400 MG tablet Take 400 mg by mouth 2 (two) times daily.    ferrous sulfate 325 mg (65 mg iron) Tab tablet Take 325 mg by mouth daily with breakfast.    finasteride (PROSCAR) 5 mg tablet Take 5 mg by mouth once daily.    lovastatin (MEVACOR) 40 MG tablet Take 40 mg by mouth every evening.    ondansetron (ZOFRAN ODT) 4 MG TbDL Take 1 tablet (4 mg total) by mouth every 6 (six) hours as needed (nausea).    predniSONE (DELTASONE) 5 MG tablet TAKE 1 TABLET BY MOUTH EVERY DAY    saw palmetto 500 MG capsule Take 500 mg by mouth 2 (two) times daily.    sulfamethoxazole-trimethoprim 800-160mg (BACTRIM DS) 800-160 mg Tab     temazepam (RESTORIL) 30 mg capsule TAKE 1 CAPSULE BY MOUTH AS NEEDED FOR SLEEP     No current facility-administered medications for this visit.          Review of Systems   Constitution: Negative for chills, decreased appetite, fever, weight gain and weight loss.   HENT: Negative for congestion, hearing loss and sore throat.    Eyes: Negative for blurred vision, double vision and visual disturbance.   Cardiovascular: Negative  "for chest pain, claudication, dyspnea on exertion, leg swelling, palpitations and syncope.   Respiratory: Negative for cough, hemoptysis, shortness of breath, sputum production and wheezing.    Endocrine: Negative for cold intolerance and heat intolerance.   Hematologic/Lymphatic: Negative for bleeding problem. Does not bruise/bleed easily.   Skin: Negative for color change, dry skin, flushing and itching.   Musculoskeletal: Negative for back pain, joint pain and myalgias.   Gastrointestinal: Negative for abdominal pain, anorexia, constipation, diarrhea, dysphagia, nausea and vomiting.        No bleeding per rectum   Genitourinary: Positive for dysuria and nocturia. Negative for flank pain, frequency and hematuria.   Neurological: Negative for dizziness, headaches, light-headedness, loss of balance, seizures and tremors.   Psychiatric/Behavioral: Negative for altered mental status and depression.         Vitals:    09/20/18 0957   BP: (!) 148/69   Pulse: 75   Weight: 98.4 kg (217 lb)   Height: 5' 10" (1.778 m)     Objective:    Physical Exam   Constitutional: He is oriented to person, place, and time. He appears well-developed and well-nourished.   HENT:   Head: Normocephalic and atraumatic.   Right Ear: External ear normal.   Left Ear: External ear normal.   Nose: Nose normal.   Eyes: Conjunctivae and EOM are normal. Pupils are equal, round, and reactive to light. No scleral icterus.   Neck: Normal range of motion. Neck supple. No JVD present. No tracheal deviation present. No thyromegaly present.   Cardiovascular: Normal rate, regular rhythm and normal heart sounds. Exam reveals no gallop and no friction rub.   No murmur heard.  Pulmonary/Chest: Effort normal and breath sounds normal. No respiratory distress. He has no rales. He exhibits no tenderness.   Abdominal: Soft. Bowel sounds are normal. He exhibits no distension and no mass. There is no tenderness.   Musculoskeletal: Normal range of motion. He exhibits no " edema or tenderness.   Lymphadenopathy:     He has no cervical adenopathy.   Neurological: He is alert and oriented to person, place, and time. He has normal reflexes. No cranial nerve deficit. Coordination normal.   Skin: Skin is warm and dry. No rash noted.   Psychiatric: He has a normal mood and affect. His behavior is normal.         Assessment:       1. Coronary artery disease involving native coronary artery of native heart without angina pectoris    2. PAD (peripheral artery disease)    3. Essential hypertension    4. Acute lower GI bleeding    5. Ex-smoker    6. Iron deficiency anemia due to chronic blood loss    7. Spinal stenosis, lumbar region, with neurogenic claudication         Plan:       Stable from a cardiovascular standpoint.  Continue the same medical regimen.

## 2018-12-20 ENCOUNTER — OFFICE VISIT (OUTPATIENT)
Dept: CARDIOLOGY | Facility: CLINIC | Age: 83
End: 2018-12-20
Attending: INTERNAL MEDICINE
Payer: MEDICARE

## 2018-12-20 VITALS
DIASTOLIC BLOOD PRESSURE: 93 MMHG | WEIGHT: 222 LBS | SYSTOLIC BLOOD PRESSURE: 175 MMHG | HEIGHT: 70 IN | BODY MASS INDEX: 31.78 KG/M2 | HEART RATE: 61 BPM

## 2018-12-20 DIAGNOSIS — Z87.891 EX-SMOKER: ICD-10-CM

## 2018-12-20 DIAGNOSIS — I10 ESSENTIAL HYPERTENSION: ICD-10-CM

## 2018-12-20 DIAGNOSIS — I73.9 PAD (PERIPHERAL ARTERY DISEASE): ICD-10-CM

## 2018-12-20 DIAGNOSIS — M51.36 DDD (DEGENERATIVE DISC DISEASE), LUMBAR: ICD-10-CM

## 2018-12-20 DIAGNOSIS — I25.10 CORONARY ARTERY DISEASE INVOLVING NATIVE CORONARY ARTERY OF NATIVE HEART WITHOUT ANGINA PECTORIS: Primary | ICD-10-CM

## 2018-12-20 PROCEDURE — 99214 OFFICE O/P EST MOD 30 MIN: CPT | Mod: S$GLB,,, | Performed by: INTERNAL MEDICINE

## 2018-12-20 RX ORDER — AMLODIPINE BESYLATE 10 MG/1
10 TABLET ORAL DAILY
COMMUNITY
End: 2020-01-16

## 2018-12-20 NOTE — PROGRESS NOTES
Subjective:    Patient ID:  Jem Pak is a 88 y.o. 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    I have had pain and swelling in my right knee.  Aside from that I am doing quite well.    Current Outpatient Medications   Medication Sig    amLODIPine (NORVASC) 10 MG tablet Take 10 mg by mouth once daily.    aspirin (ECOTRIN) 81 MG EC tablet Take 81 mg by mouth once daily.    doxazosin (CARDURA XL) 8 mg 24 hr tablet Take 8 mg by mouth daily with breakfast.    etodolac (LODINE) 400 MG tablet Take 400 mg by mouth 2 (two) times daily.    ferrous sulfate 325 mg (65 mg iron) Tab tablet Take 325 mg by mouth daily with breakfast.    finasteride (PROSCAR) 5 mg tablet Take 5 mg by mouth once daily.    lovastatin (MEVACOR) 40 MG tablet Take 40 mg by mouth every evening.    ondansetron (ZOFRAN ODT) 4 MG TbDL Take 1 tablet (4 mg total) by mouth every 6 (six) hours as needed (nausea).    predniSONE (DELTASONE) 5 MG tablet TAKE 1 TABLET BY MOUTH EVERY DAY    saw palmetto 500 MG capsule Take 500 mg by mouth 2 (two) times daily.    sulfamethoxazole-trimethoprim 800-160mg (BACTRIM DS) 800-160 mg Tab     temazepam (RESTORIL) 30 mg capsule TAKE 1 CAPSULE BY MOUTH AS NEEDED FOR SLEEP     No current facility-administered medications for this visit.          Review of Systems   Constitution: Negative for chills, decreased appetite, fever, weight gain and weight loss.   HENT: Negative for congestion, hearing loss and sore throat.    Eyes: Negative for blurred vision, double vision and visual disturbance.   Cardiovascular: Negative for chest pain, claudication, dyspnea on exertion, leg swelling, palpitations and syncope.   Respiratory: Negative for cough, hemoptysis, shortness of breath, sputum production and wheezing.    Endocrine: Negative for cold intolerance and heat  "intolerance.   Hematologic/Lymphatic: Negative for bleeding problem. Does not bruise/bleed easily.   Skin: Negative for color change, dry skin, flushing and itching.   Musculoskeletal: Positive for arthritis and joint pain. Negative for back pain and myalgias.   Gastrointestinal: Negative for abdominal pain, anorexia, constipation, diarrhea, dysphagia, nausea and vomiting.        No bleeding per rectum   Genitourinary: Negative for dysuria, flank pain, frequency, hematuria and nocturia.   Neurological: Negative for dizziness, headaches, light-headedness, loss of balance, seizures and tremors.   Psychiatric/Behavioral: Negative for altered mental status and depression.         Vitals:    12/20/18 1009   BP: (!) 175/93   Pulse: 61   Weight: 100.7 kg (222 lb)   Height: 5' 10" (1.778 m)     Objective:    Physical Exam   Constitutional: He is oriented to person, place, and time. He appears well-developed and well-nourished.   HENT:   Head: Normocephalic and atraumatic.   Right Ear: External ear normal.   Left Ear: External ear normal.   Nose: Nose normal.   Eyes: Conjunctivae and EOM are normal. Pupils are equal, round, and reactive to light. No scleral icterus.   Neck: Normal range of motion. Neck supple. No JVD present. No tracheal deviation present. No thyromegaly present.   Cardiovascular: Normal rate, regular rhythm and normal heart sounds. Exam reveals no gallop and no friction rub.   No murmur heard.  Pulmonary/Chest: Effort normal and breath sounds normal. No respiratory distress. He has no rales. He exhibits no tenderness.   Abdominal: Soft. Bowel sounds are normal. He exhibits no distension and no mass. There is no tenderness.   Musculoskeletal: Normal range of motion. He exhibits no edema or tenderness.   Lymphadenopathy:     He has no cervical adenopathy.   Neurological: He is alert and oriented to person, place, and time. He has normal reflexes. No cranial nerve deficit. Coordination normal.   Skin: Skin is " warm and dry. No rash noted.   Psychiatric: He has a normal mood and affect. His behavior is normal.         Assessment:       1. Coronary artery disease involving native coronary artery of native heart without angina pectoris    2. PAD (peripheral artery disease)    3. Essential hypertension    4. DDD (degenerative disc disease), lumbar    5. Ex-smoker         Plan:       Increase amlodipine to 10 mg daily.  See Dr. Claude Williams IV

## 2019-04-18 ENCOUNTER — OFFICE VISIT (OUTPATIENT)
Dept: CARDIOLOGY | Facility: CLINIC | Age: 84
End: 2019-04-18
Attending: INTERNAL MEDICINE
Payer: MEDICARE

## 2019-04-18 VITALS
HEART RATE: 68 BPM | HEIGHT: 70 IN | SYSTOLIC BLOOD PRESSURE: 149 MMHG | WEIGHT: 222 LBS | BODY MASS INDEX: 31.78 KG/M2 | DIASTOLIC BLOOD PRESSURE: 82 MMHG

## 2019-04-18 DIAGNOSIS — Z87.891 EX-SMOKER: ICD-10-CM

## 2019-04-18 DIAGNOSIS — M51.36 DDD (DEGENERATIVE DISC DISEASE), LUMBAR: ICD-10-CM

## 2019-04-18 DIAGNOSIS — I25.10 CORONARY ARTERY DISEASE INVOLVING NATIVE CORONARY ARTERY OF NATIVE HEART WITHOUT ANGINA PECTORIS: Primary | ICD-10-CM

## 2019-04-18 DIAGNOSIS — I10 ESSENTIAL HYPERTENSION: ICD-10-CM

## 2019-04-18 DIAGNOSIS — D50.0 IRON DEFICIENCY ANEMIA DUE TO CHRONIC BLOOD LOSS: ICD-10-CM

## 2019-04-18 DIAGNOSIS — I73.9 PAD (PERIPHERAL ARTERY DISEASE): ICD-10-CM

## 2019-04-18 DIAGNOSIS — K92.2 ACUTE LOWER GI BLEEDING: ICD-10-CM

## 2019-04-18 PROCEDURE — 99213 OFFICE O/P EST LOW 20 MIN: CPT | Mod: S$GLB,,, | Performed by: INTERNAL MEDICINE

## 2019-04-18 PROCEDURE — 99213 PR OFFICE/OUTPT VISIT, EST, LEVL III, 20-29 MIN: ICD-10-PCS | Mod: S$GLB,,, | Performed by: INTERNAL MEDICINE

## 2019-04-18 NOTE — PROGRESS NOTES
Subjective:    Patient ID:  Jem Pak is a 88 y.o. male     HPI   Here for follow-up of coronary artery disease, status post PTCA of the obtuse marginal branch during a STEMI in 1990, P ADD, status post right common iliac stent in 2008, essential hypertension, iron deficiency anemia due to chronic blood loss, history of lower GI bleeding, ex cigarette smoker, degenerative joint disease of the lumbosacral spine.    I am doing well.  I was placed on physical therapy for my knees, and they got a little better.    Current Outpatient Medications   Medication Sig    amLODIPine (NORVASC) 10 MG tablet Take 10 mg by mouth once daily.    aspirin (ECOTRIN) 81 MG EC tablet Take 81 mg by mouth once daily.    doxazosin (CARDURA XL) 8 mg 24 hr tablet Take 8 mg by mouth daily with breakfast.    etodolac (LODINE) 400 MG tablet Take 400 mg by mouth 2 (two) times daily.    ferrous sulfate 325 mg (65 mg iron) Tab tablet Take 325 mg by mouth daily with breakfast.    finasteride (PROSCAR) 5 mg tablet Take 5 mg by mouth once daily.    lovastatin (MEVACOR) 40 MG tablet Take 40 mg by mouth every evening.    ondansetron (ZOFRAN ODT) 4 MG TbDL Take 1 tablet (4 mg total) by mouth every 6 (six) hours as needed (nausea).    predniSONE (DELTASONE) 5 MG tablet TAKE 1 TABLET BY MOUTH EVERY DAY    saw palmetto 500 MG capsule Take 500 mg by mouth 2 (two) times daily.    sulfamethoxazole-trimethoprim 800-160mg (BACTRIM DS) 800-160 mg Tab     temazepam (RESTORIL) 30 mg capsule TAKE 1 CAPSULE BY MOUTH AS NEEDED FOR SLEEP     No current facility-administered medications for this visit.          Review of Systems   Constitution: Negative for chills, decreased appetite, fever, weight gain and weight loss.   HENT: Negative for congestion, hearing loss and sore throat.    Eyes: Negative for blurred vision, double vision and visual disturbance.   Cardiovascular: Negative for chest pain, claudication, dyspnea on exertion, leg swelling,  "palpitations and syncope.   Respiratory: Negative for cough, hemoptysis, shortness of breath, sputum production and wheezing.    Endocrine: Negative for cold intolerance and heat intolerance.   Hematologic/Lymphatic: Negative for bleeding problem. Does not bruise/bleed easily.   Skin: Negative for color change, dry skin, flushing and itching.   Musculoskeletal: Negative for back pain, joint pain and myalgias.   Gastrointestinal: Negative for abdominal pain, anorexia, constipation, diarrhea, dysphagia, nausea and vomiting.        No bleeding per rectum   Genitourinary: Negative for dysuria, flank pain, frequency, hematuria and nocturia.   Neurological: Negative for dizziness, headaches, light-headedness, loss of balance, seizures and tremors.   Psychiatric/Behavioral: Negative for altered mental status and depression.         Vitals:    04/18/19 1102   BP: (!) 149/82   Pulse: 68   Weight: 100.7 kg (222 lb)   Height: 5' 10" (1.778 m)     Objective:    Physical Exam   Constitutional: He is oriented to person, place, and time. He appears well-developed and well-nourished.   HENT:   Head: Normocephalic and atraumatic.   Right Ear: External ear normal.   Left Ear: External ear normal.   Nose: Nose normal.   Eyes: Pupils are equal, round, and reactive to light. Conjunctivae and EOM are normal. No scleral icterus.   Neck: Normal range of motion. Neck supple. No JVD present. No tracheal deviation present. No thyromegaly present.   Cardiovascular: Normal rate, regular rhythm and normal heart sounds. Exam reveals no gallop and no friction rub.   No murmur heard.  Pulmonary/Chest: Effort normal and breath sounds normal. No respiratory distress. He has no rales. He exhibits no tenderness.   Abdominal: Soft. Bowel sounds are normal. He exhibits no distension and no mass. There is no tenderness.   Musculoskeletal: Normal range of motion. He exhibits no edema or tenderness.   Lymphadenopathy:     He has no cervical adenopathy. "   Neurological: He is alert and oriented to person, place, and time. He has normal reflexes. No cranial nerve deficit. Coordination normal.   Skin: Skin is warm and dry. No rash noted.   Psychiatric: He has a normal mood and affect. His behavior is normal.         Assessment:       1. Coronary artery disease involving native coronary artery of native heart without angina pectoris    2. PAD (peripheral artery disease)    3. Essential hypertension    4. Iron deficiency anemia due to chronic blood loss    5. Acute lower GI bleeding    6. Ex-smoker    7. DDD (degenerative disc disease), lumbar         Plan:       Stable.  Continue the same pharmacological regimen.

## 2019-07-25 ENCOUNTER — OFFICE VISIT (OUTPATIENT)
Dept: CARDIOLOGY | Facility: CLINIC | Age: 84
End: 2019-07-25
Attending: INTERNAL MEDICINE
Payer: MEDICARE

## 2019-07-25 VITALS
SYSTOLIC BLOOD PRESSURE: 150 MMHG | WEIGHT: 219 LBS | DIASTOLIC BLOOD PRESSURE: 69 MMHG | BODY MASS INDEX: 31.35 KG/M2 | HEART RATE: 72 BPM | HEIGHT: 70 IN

## 2019-07-25 DIAGNOSIS — Z87.891 EX-SMOKER: ICD-10-CM

## 2019-07-25 DIAGNOSIS — N40.1 BENIGN PROSTATIC HYPERPLASIA WITH URINARY FREQUENCY: ICD-10-CM

## 2019-07-25 DIAGNOSIS — M51.36 DDD (DEGENERATIVE DISC DISEASE), LUMBAR: ICD-10-CM

## 2019-07-25 DIAGNOSIS — I25.10 CORONARY ARTERY DISEASE INVOLVING NATIVE CORONARY ARTERY OF NATIVE HEART WITHOUT ANGINA PECTORIS: ICD-10-CM

## 2019-07-25 DIAGNOSIS — I10 ESSENTIAL HYPERTENSION: Primary | ICD-10-CM

## 2019-07-25 DIAGNOSIS — I73.9 PAD (PERIPHERAL ARTERY DISEASE): ICD-10-CM

## 2019-07-25 DIAGNOSIS — R35.0 BENIGN PROSTATIC HYPERPLASIA WITH URINARY FREQUENCY: ICD-10-CM

## 2019-07-25 PROCEDURE — 99213 PR OFFICE/OUTPT VISIT, EST, LEVL III, 20-29 MIN: ICD-10-PCS | Mod: S$GLB,,, | Performed by: INTERNAL MEDICINE

## 2019-07-25 PROCEDURE — 99213 OFFICE O/P EST LOW 20 MIN: CPT | Mod: S$GLB,,, | Performed by: INTERNAL MEDICINE

## 2019-07-25 RX ORDER — TAMSULOSIN HYDROCHLORIDE 0.4 MG/1
0.4 CAPSULE ORAL DAILY
Qty: 90 CAPSULE | Refills: 3 | Status: SHIPPED | OUTPATIENT
Start: 2019-07-25 | End: 2020-07-24

## 2019-07-25 NOTE — PROGRESS NOTES
Subjective:    Patient ID:  Jem Pak is a 88 y.o. male     HPI   Here for follow-up of coronary artery disease, status post PTCA of the obtuse marginal branch during a STEMI in 1990, P ADD, status post right common iliac stent in 2008, essential hypertension, iron deficiency anemia due to chronic blood loss, history of lower GI bleeding, ex cigarette smoker, degenerative joint disease of the lumbosacral spine.    I need to make an appointment with a urologist have increased frequency and reduction in my stream.  My back is a little better.    Current Outpatient Medications   Medication Sig    amLODIPine (NORVASC) 10 MG tablet Take 10 mg by mouth once daily.    aspirin (ECOTRIN) 81 MG EC tablet Take 81 mg by mouth once daily.    doxazosin (CARDURA XL) 8 mg 24 hr tablet Take 8 mg by mouth daily with breakfast.    etodolac (LODINE) 400 MG tablet Take 400 mg by mouth 2 (two) times daily.    ferrous sulfate 325 mg (65 mg iron) Tab tablet Take 325 mg by mouth daily with breakfast.    finasteride (PROSCAR) 5 mg tablet Take 5 mg by mouth once daily.    lovastatin (MEVACOR) 40 MG tablet Take 40 mg by mouth every evening.    ondansetron (ZOFRAN ODT) 4 MG TbDL Take 1 tablet (4 mg total) by mouth every 6 (six) hours as needed (nausea).    predniSONE (DELTASONE) 5 MG tablet TAKE 1 TABLET BY MOUTH EVERY DAY    saw palmetto 500 MG capsule Take 500 mg by mouth 2 (two) times daily.    sulfamethoxazole-trimethoprim 800-160mg (BACTRIM DS) 800-160 mg Tab     tamsulosin (FLOMAX) 0.4 mg Cap Take 1 capsule (0.4 mg total) by mouth once daily.    temazepam (RESTORIL) 30 mg capsule TAKE 1 CAPSULE BY MOUTH AS NEEDED FOR SLEEP     No current facility-administered medications for this visit.             Review of Systems   Constitution: Negative for chills, decreased appetite, fever, weight gain and weight loss.   HENT: Negative for congestion, hearing loss and sore throat.    Eyes: Negative for blurred vision, double vision  "and visual disturbance.   Cardiovascular: Negative for chest pain, claudication, dyspnea on exertion, leg swelling, palpitations and syncope.   Respiratory: Negative for cough, hemoptysis, shortness of breath, sputum production and wheezing.    Endocrine: Negative for cold intolerance and heat intolerance.   Hematologic/Lymphatic: Negative for bleeding problem. Does not bruise/bleed easily.   Skin: Negative for color change, dry skin, flushing and itching.   Musculoskeletal: Positive for back pain. Negative for joint pain and myalgias.   Gastrointestinal: Negative for abdominal pain, anorexia, constipation, diarrhea, dysphagia, nausea and vomiting.        No bleeding per rectum   Genitourinary: Negative for dysuria, flank pain, frequency, hematuria and nocturia.   Neurological: Negative for dizziness, headaches, light-headedness, loss of balance, seizures and tremors.   Psychiatric/Behavioral: Negative for altered mental status and depression.     Vitals:    07/25/19 1042   BP: (!) 150/69   Pulse: 72   Weight: 99.3 kg (219 lb)   Height: 5' 10" (1.778 m)        Objective:    Physical Exam   Constitutional: He is oriented to person, place, and time. He appears well-developed and well-nourished.   HENT:   Head: Normocephalic and atraumatic.   Right Ear: External ear normal.   Left Ear: External ear normal.   Nose: Nose normal.   Eyes: Pupils are equal, round, and reactive to light. Conjunctivae and EOM are normal. No scleral icterus.   Neck: Normal range of motion. Neck supple. No JVD present. No tracheal deviation present. No thyromegaly present.   Cardiovascular: Normal rate, regular rhythm and normal heart sounds. Exam reveals no gallop and no friction rub.   No murmur heard.  Pulmonary/Chest: Effort normal and breath sounds normal. No respiratory distress. He has no rales. He exhibits no tenderness.   Abdominal: Soft. Bowel sounds are normal. He exhibits no distension and no mass. There is no tenderness. "   Musculoskeletal: Normal range of motion. He exhibits no edema or tenderness.   Lymphadenopathy:     He has no cervical adenopathy.   Neurological: He is alert and oriented to person, place, and time. He has normal reflexes. No cranial nerve deficit. Coordination normal.   Skin: Skin is warm and dry. No rash noted.   Psychiatric: He has a normal mood and affect. His behavior is normal.         Assessment:       1. Essential hypertension    2. Coronary artery disease involving native coronary artery of native heart without angina pectoris    3. PAD (peripheral artery disease)    4. DDD (degenerative disc disease), lumbar    5. Ex-smoker    6. Benign prostatic hyperplasia with urinary frequency         Plan:       Prescribe tamsulosin 0.4 mg p.o. Q HS  See urologist  Continue the remaining pharmacological regimen.

## 2019-08-06 RX ORDER — PREDNISONE 5 MG/1
TABLET ORAL
Qty: 90 TABLET | Refills: 4 | Status: SHIPPED | OUTPATIENT
Start: 2019-08-06

## 2019-10-31 ENCOUNTER — OFFICE VISIT (OUTPATIENT)
Dept: CARDIOLOGY | Facility: CLINIC | Age: 84
End: 2019-10-31
Attending: INTERNAL MEDICINE
Payer: MEDICARE

## 2019-10-31 VITALS
SYSTOLIC BLOOD PRESSURE: 152 MMHG | HEIGHT: 70 IN | DIASTOLIC BLOOD PRESSURE: 60 MMHG | BODY MASS INDEX: 29.92 KG/M2 | WEIGHT: 209 LBS | HEART RATE: 57 BPM

## 2019-10-31 DIAGNOSIS — I73.9 PAD (PERIPHERAL ARTERY DISEASE): ICD-10-CM

## 2019-10-31 DIAGNOSIS — M51.36 DDD (DEGENERATIVE DISC DISEASE), LUMBAR: ICD-10-CM

## 2019-10-31 DIAGNOSIS — Z87.891 EX-SMOKER: ICD-10-CM

## 2019-10-31 DIAGNOSIS — R35.0 BENIGN PROSTATIC HYPERPLASIA WITH URINARY FREQUENCY: ICD-10-CM

## 2019-10-31 DIAGNOSIS — I25.10 CORONARY ARTERY DISEASE INVOLVING NATIVE CORONARY ARTERY OF NATIVE HEART WITHOUT ANGINA PECTORIS: Primary | ICD-10-CM

## 2019-10-31 DIAGNOSIS — N40.1 BENIGN PROSTATIC HYPERPLASIA WITH URINARY FREQUENCY: ICD-10-CM

## 2019-10-31 DIAGNOSIS — I10 ESSENTIAL HYPERTENSION: ICD-10-CM

## 2019-10-31 PROCEDURE — 99213 OFFICE O/P EST LOW 20 MIN: CPT | Mod: S$GLB,,, | Performed by: INTERNAL MEDICINE

## 2019-10-31 PROCEDURE — 99213 PR OFFICE/OUTPT VISIT, EST, LEVL III, 20-29 MIN: ICD-10-PCS | Mod: S$GLB,,, | Performed by: INTERNAL MEDICINE

## 2019-10-31 NOTE — PROGRESS NOTES
Subjective:    Patient ID:  Jem Pak is a 89 y.o. male     HPI  Here for follow-up of coronary artery disease, status post PTCA of the obtuse marginal branch during a STEMI in 1990, PAD, status post right common iliac stent in 2008, essential hypertension, iron deficiency anemia due to chronic blood loss, history of lower GI bleeding, ex cigarette smoker, degenerative joint disease of the lumbosacral spine.     My back does not allow me to get around too much.  Lately have been taking care of my wife who had a  back operation.    Current Outpatient Medications   Medication Sig    amLODIPine (NORVASC) 10 MG tablet Take 10 mg by mouth once daily.    aspirin (ECOTRIN) 81 MG EC tablet Take 81 mg by mouth once daily.    doxazosin (CARDURA XL) 8 mg 24 hr tablet Take 8 mg by mouth daily with breakfast.    etodolac (LODINE) 400 MG tablet Take 400 mg by mouth 2 (two) times daily.    ferrous sulfate 325 mg (65 mg iron) Tab tablet Take 325 mg by mouth daily with breakfast.    finasteride (PROSCAR) 5 mg tablet Take 5 mg by mouth once daily.    lovastatin (MEVACOR) 40 MG tablet Take 40 mg by mouth every evening.    ondansetron (ZOFRAN ODT) 4 MG TbDL Take 1 tablet (4 mg total) by mouth every 6 (six) hours as needed (nausea).    predniSONE (DELTASONE) 5 MG tablet TAKE 1 TABLET BY MOUTH EVERY DAY    saw palmetto 500 MG capsule Take 500 mg by mouth 2 (two) times daily.    sulfamethoxazole-trimethoprim 800-160mg (BACTRIM DS) 800-160 mg Tab     tamsulosin (FLOMAX) 0.4 mg Cap Take 1 capsule (0.4 mg total) by mouth once daily.    temazepam (RESTORIL) 30 mg capsule TAKE 1 CAPSULE BY MOUTH AS NEEDED FOR SLEEP     No current facility-administered medications for this visit.        Review of Systems   Constitution: Negative for chills, decreased appetite, fever, weight gain and weight loss.   HENT: Negative for congestion, hearing loss and sore throat.    Eyes: Negative for blurred vision, double vision and visual  "disturbance.   Cardiovascular: Negative for chest pain, claudication, dyspnea on exertion, leg swelling, palpitations and syncope.   Respiratory: Negative for cough, hemoptysis, shortness of breath, sputum production and wheezing.    Endocrine: Negative for cold intolerance and heat intolerance.   Hematologic/Lymphatic: Negative for bleeding problem. Does not bruise/bleed easily.   Skin: Negative for color change, dry skin, flushing and itching.   Musculoskeletal: Positive for back pain. Negative for joint pain and myalgias.   Gastrointestinal: Negative for abdominal pain, anorexia, constipation, diarrhea, dysphagia, nausea and vomiting.        No bleeding per rectum   Genitourinary: Negative for dysuria, flank pain, frequency, hematuria and nocturia.   Neurological: Negative for dizziness, headaches, light-headedness, loss of balance, seizures and tremors.   Psychiatric/Behavioral: Negative for altered mental status and depression.         Vitals:    10/31/19 1104   BP: (!) 152/60   Pulse: (!) 57   Weight: 94.8 kg (209 lb)   Height: 5' 10" (1.778 m)    Blood pressure recheck 138/58    Objective:    Physical Exam   Constitutional: He is oriented to person, place, and time. He appears well-developed and well-nourished.   HENT:   Head: Normocephalic and atraumatic.   Right Ear: External ear normal.   Left Ear: External ear normal.   Nose: Nose normal.   Eyes: Pupils are equal, round, and reactive to light. Conjunctivae and EOM are normal. No scleral icterus.   Neck: Normal range of motion. Neck supple. No JVD present. No tracheal deviation present. No thyromegaly present.   Cardiovascular: Normal rate, regular rhythm and normal heart sounds. Exam reveals no gallop and no friction rub.   No murmur heard.  Pulmonary/Chest: Effort normal and breath sounds normal. No respiratory distress. He has no rales. He exhibits no tenderness.   Abdominal: Soft. Bowel sounds are normal. He exhibits no distension and no mass. There is " no tenderness.   Musculoskeletal: Normal range of motion. He exhibits no edema or tenderness.   Lymphadenopathy:     He has no cervical adenopathy.   Neurological: He is alert and oriented to person, place, and time. He has normal reflexes. No cranial nerve deficit. Coordination normal.   Skin: Skin is warm and dry. No rash noted.   Psychiatric: He has a normal mood and affect. His behavior is normal.         Assessment:       1. Coronary artery disease involving native coronary artery of native heart without angina pectoris    2. PAD (peripheral artery disease)    3. Essential hypertension    4. Benign prostatic hyperplasia with urinary frequency    5. DDD (degenerative disc disease), lumbar    6. Ex-smoker         Plan:         Stable.  Continue present pharmacological regimen.

## 2020-01-16 RX ORDER — AMLODIPINE BESYLATE 10 MG/1
TABLET ORAL
Qty: 90 TABLET | Refills: 3 | Status: SHIPPED | OUTPATIENT
Start: 2020-01-16 | End: 2021-01-14

## 2020-02-27 ENCOUNTER — OFFICE VISIT (OUTPATIENT)
Dept: CARDIOLOGY | Facility: CLINIC | Age: 85
End: 2020-02-27
Attending: INTERNAL MEDICINE
Payer: MEDICARE

## 2020-02-27 DIAGNOSIS — Z87.891 EX-SMOKER: ICD-10-CM

## 2020-02-27 DIAGNOSIS — I73.9 PAD (PERIPHERAL ARTERY DISEASE): ICD-10-CM

## 2020-02-27 DIAGNOSIS — I25.10 CORONARY ARTERY DISEASE INVOLVING NATIVE CORONARY ARTERY OF NATIVE HEART WITHOUT ANGINA PECTORIS: Primary | ICD-10-CM

## 2020-02-27 DIAGNOSIS — I10 ESSENTIAL HYPERTENSION: ICD-10-CM

## 2020-02-27 DIAGNOSIS — M51.36 DDD (DEGENERATIVE DISC DISEASE), LUMBAR: ICD-10-CM

## 2020-02-27 PROCEDURE — 99213 OFFICE O/P EST LOW 20 MIN: CPT | Mod: 25,S$GLB,, | Performed by: INTERNAL MEDICINE

## 2020-02-27 PROCEDURE — 93000 ELECTROCARDIOGRAM COMPLETE: CPT | Mod: S$GLB,,, | Performed by: INTERNAL MEDICINE

## 2020-02-27 PROCEDURE — 1159F MED LIST DOCD IN RCRD: CPT | Mod: S$GLB,,, | Performed by: INTERNAL MEDICINE

## 2020-02-27 PROCEDURE — 99213 PR OFFICE/OUTPT VISIT, EST, LEVL III, 20-29 MIN: ICD-10-PCS | Mod: 25,S$GLB,, | Performed by: INTERNAL MEDICINE

## 2020-02-27 PROCEDURE — 1159F PR MEDICATION LIST DOCUMENTED IN MEDICAL RECORD: ICD-10-PCS | Mod: S$GLB,,, | Performed by: INTERNAL MEDICINE

## 2020-02-27 PROCEDURE — 93000 PR ELECTROCARDIOGRAM, COMPLETE: ICD-10-PCS | Mod: S$GLB,,, | Performed by: INTERNAL MEDICINE

## 2020-02-29 NOTE — PROGRESS NOTES
Subjective:    Patient ID:  Jem Pak is a 89 y.o. male     HPI  Here for follow-up of coronary artery disease, status post PTCA of the obtuse marginal branch during a STEMI in 1990, PAD, status post right common iliac stent in 2008, essential hypertension, iron deficiency anemia due to chronic blood loss, history of lower GI bleeding, ex cigarette smoker, degenerative joint disease of the lumbosacral spine.     I am doing well.  Aside from my back pain, I have no other complaint    Current Outpatient Medications   Medication Sig    amLODIPine (NORVASC) 10 MG tablet TAKE 1 TABLET BY MOUTH EVERY DAY    aspirin (ECOTRIN) 81 MG EC tablet Take 81 mg by mouth once daily.    doxazosin (CARDURA XL) 8 mg 24 hr tablet Take 8 mg by mouth daily with breakfast.    etodolac (LODINE) 400 MG tablet Take 400 mg by mouth 2 (two) times daily.    ferrous sulfate 325 mg (65 mg iron) Tab tablet Take 325 mg by mouth daily with breakfast.    finasteride (PROSCAR) 5 mg tablet Take 5 mg by mouth once daily.    lovastatin (MEVACOR) 40 MG tablet Take 40 mg by mouth every evening.    ondansetron (ZOFRAN ODT) 4 MG TbDL Take 1 tablet (4 mg total) by mouth every 6 (six) hours as needed (nausea).    predniSONE (DELTASONE) 5 MG tablet TAKE 1 TABLET BY MOUTH EVERY DAY    saw palmetto 500 MG capsule Take 500 mg by mouth 2 (two) times daily.    sulfamethoxazole-trimethoprim 800-160mg (BACTRIM DS) 800-160 mg Tab     tamsulosin (FLOMAX) 0.4 mg Cap Take 1 capsule (0.4 mg total) by mouth once daily.    temazepam (RESTORIL) 30 mg capsule TAKE 1 CAPSULE BY MOUTH AS NEEDED FOR SLEEP     No current facility-administered medications for this visit.        Review of Systems   Constitution: Negative for chills, decreased appetite, fever, weight gain and weight loss.   HENT: Negative for congestion, hearing loss and sore throat.    Eyes: Negative for blurred vision, double vision and visual disturbance.   Cardiovascular: Negative for chest  pain, claudication, dyspnea on exertion, leg swelling, palpitations and syncope.   Respiratory: Negative for cough, hemoptysis, shortness of breath, sputum production and wheezing.    Endocrine: Negative for cold intolerance and heat intolerance.   Hematologic/Lymphatic: Negative for bleeding problem. Does not bruise/bleed easily.   Skin: Negative for color change, dry skin, flushing and itching.   Musculoskeletal: Positive for back pain. Negative for joint pain and myalgias.   Gastrointestinal: Negative for abdominal pain, anorexia, constipation, diarrhea, dysphagia, nausea and vomiting.        No bleeding per rectum   Genitourinary: Negative for dysuria, flank pain, frequency, hematuria and nocturia.   Neurological: Negative for dizziness, headaches, light-headedness, loss of balance, seizures and tremors.   Psychiatric/Behavioral: Negative for altered mental status and depression.       blood pressure 126/72  .  Pulse 72 per minute.    Objective:    Physical Exam   Constitutional: He is oriented to person, place, and time. He appears well-developed and well-nourished.   HENT:   Head: Normocephalic and atraumatic.   Right Ear: External ear normal.   Left Ear: External ear normal.   Nose: Nose normal.   Eyes: Pupils are equal, round, and reactive to light. Conjunctivae and EOM are normal. No scleral icterus.   Neck: Normal range of motion. Neck supple. No JVD present. No tracheal deviation present. No thyromegaly present.   Cardiovascular: Normal rate, regular rhythm and normal heart sounds. Exam reveals no gallop and no friction rub.   No murmur heard.  Pulmonary/Chest: Effort normal and breath sounds normal. No respiratory distress. He has no rales. He exhibits no tenderness.   Abdominal: Soft. Bowel sounds are normal. He exhibits no distension and no mass. There is no tenderness.   Musculoskeletal: Normal range of motion. He exhibits no edema or tenderness.   Lymphadenopathy:     He has no cervical adenopathy.    Neurological: He is alert and oriented to person, place, and time. He has normal reflexes. No cranial nerve deficit. Coordination normal.   Skin: Skin is warm and dry. No rash noted.   Psychiatric: He has a normal mood and affect. His behavior is normal.         Assessment:       1. Coronary artery disease involving native coronary artery of native heart without angina pectoris    2. Essential hypertension    3. PAD (peripheral artery disease)    4. DDD (degenerative disc disease), lumbar    5. Ex-smoker         Plan:         Stable.  Continue present pharmacological regimen.

## 2021-01-10 ENCOUNTER — IMMUNIZATION (OUTPATIENT)
Dept: PRIMARY CARE CLINIC | Facility: CLINIC | Age: 86
End: 2021-01-10
Payer: MEDICARE

## 2021-01-10 DIAGNOSIS — Z23 NEED FOR VACCINATION: ICD-10-CM

## 2021-01-10 PROCEDURE — 91300 COVID-19, MRNA, LNP-S, PF, 30 MCG/0.3 ML DOSE VACCINE: CPT | Mod: PBBFAC | Performed by: FAMILY MEDICINE

## 2021-01-31 ENCOUNTER — IMMUNIZATION (OUTPATIENT)
Dept: PRIMARY CARE CLINIC | Facility: CLINIC | Age: 86
End: 2021-01-31
Payer: MEDICARE

## 2021-01-31 DIAGNOSIS — Z23 NEED FOR VACCINATION: Primary | ICD-10-CM

## 2021-01-31 PROCEDURE — 91300 COVID-19, MRNA, LNP-S, PF, 30 MCG/0.3 ML DOSE VACCINE: CPT | Mod: PBBFAC | Performed by: EMERGENCY MEDICINE

## 2021-01-31 PROCEDURE — 0002A COVID-19, MRNA, LNP-S, PF, 30 MCG/0.3 ML DOSE VACCINE: CPT | Mod: PBBFAC | Performed by: EMERGENCY MEDICINE

## 2021-05-31 ENCOUNTER — HOSPITAL ENCOUNTER (EMERGENCY)
Facility: OTHER | Age: 86
Discharge: HOME | End: 2021-05-31
Attending: EMERGENCY MEDICINE
Payer: MEDICARE

## 2021-05-31 VITALS
DIASTOLIC BLOOD PRESSURE: 66 MMHG | HEART RATE: 71 BPM | HEIGHT: 70 IN | SYSTOLIC BLOOD PRESSURE: 139 MMHG | TEMPERATURE: 99 F | OXYGEN SATURATION: 97 % | WEIGHT: 225 LBS | BODY MASS INDEX: 32.21 KG/M2 | RESPIRATION RATE: 18 BRPM

## 2021-05-31 DIAGNOSIS — R11.2 NAUSEA, VOMITING, AND DIARRHEA: Primary | ICD-10-CM

## 2021-05-31 DIAGNOSIS — R19.7 NAUSEA, VOMITING, AND DIARRHEA: Primary | ICD-10-CM

## 2021-05-31 LAB
ALBUMIN SERPL BCP-MCNC: 3.7 G/DL (ref 3.5–5.2)
ALLENS TEST: ABNORMAL
ALP SERPL-CCNC: 95 U/L (ref 55–135)
ALT SERPL W/O P-5'-P-CCNC: 22 U/L (ref 10–44)
ANION GAP SERPL CALC-SCNC: 13 MMOL/L (ref 8–16)
AST SERPL-CCNC: 31 U/L (ref 10–40)
BASOPHILS # BLD AUTO: 0.02 K/UL (ref 0–0.2)
BASOPHILS NFR BLD: 0.2 % (ref 0–1.9)
BILIRUB SERPL-MCNC: 0.5 MG/DL (ref 0.1–1)
BUN SERPL-MCNC: 16 MG/DL (ref 8–23)
CALCIUM SERPL-MCNC: 8.6 MG/DL (ref 8.7–10.5)
CHLORIDE SERPL-SCNC: 107 MMOL/L (ref 95–110)
CO2 SERPL-SCNC: 18 MMOL/L (ref 23–29)
CREAT SERPL-MCNC: 1.3 MG/DL (ref 0.5–1.4)
DELSYS: ABNORMAL
DIFFERENTIAL METHOD: ABNORMAL
EOSINOPHIL # BLD AUTO: 0.1 K/UL (ref 0–0.5)
EOSINOPHIL NFR BLD: 1.2 % (ref 0–8)
ERYTHROCYTE [DISTWIDTH] IN BLOOD BY AUTOMATED COUNT: 14.2 % (ref 11.5–14.5)
EST. GFR  (AFRICAN AMERICAN): 56 ML/MIN/1.73 M^2
EST. GFR  (NON AFRICAN AMERICAN): 48 ML/MIN/1.73 M^2
FIO2: 21
GLUCOSE SERPL-MCNC: 132 MG/DL (ref 70–110)
HCO3 UR-SCNC: 25.2 MMOL/L (ref 24–28)
HCT VFR BLD AUTO: 43.8 % (ref 40–54)
HCT VFR BLD CALC: 41 %PCV (ref 36–54)
HGB BLD-MCNC: 13.9 G/DL (ref 14–18)
HGB BLD-MCNC: 14 G/DL
IMM GRANULOCYTES # BLD AUTO: 0.03 K/UL (ref 0–0.04)
IMM GRANULOCYTES NFR BLD AUTO: 0.3 % (ref 0–0.5)
LYMPHOCYTES # BLD AUTO: 0.8 K/UL (ref 1–4.8)
LYMPHOCYTES NFR BLD: 7.6 % (ref 18–48)
MCH RBC QN AUTO: 27.1 PG (ref 27–31)
MCHC RBC AUTO-ENTMCNC: 31.7 G/DL (ref 32–36)
MCV RBC AUTO: 86 FL (ref 82–98)
MODE: ABNORMAL
MONOCYTES # BLD AUTO: 1 K/UL (ref 0.3–1)
MONOCYTES NFR BLD: 8.8 % (ref 4–15)
NEUTROPHILS # BLD AUTO: 8.9 K/UL (ref 1.8–7.7)
NEUTROPHILS NFR BLD: 81.9 % (ref 38–73)
NRBC BLD-RTO: 0 /100 WBC
PCO2 BLDA: 45.9 MMHG (ref 35–45)
PH SMN: 7.35 [PH] (ref 7.35–7.45)
PLATELET # BLD AUTO: 145 K/UL (ref 150–450)
PMV BLD AUTO: 12.3 FL (ref 9.2–12.9)
PO2 BLDA: 35 MMHG (ref 40–60)
POC BE: 0 MMOL/L
POC IONIZED CALCIUM: 1.16 MMOL/L (ref 1.06–1.42)
POC SATURATED O2: 64 % (ref 95–100)
POC TCO2: 27 MMOL/L (ref 24–29)
POTASSIUM BLD-SCNC: 3.3 MMOL/L (ref 3.5–5.1)
POTASSIUM SERPL-SCNC: 5 MMOL/L (ref 3.5–5.1)
PROT SERPL-MCNC: 7.7 G/DL (ref 6–8.4)
RBC # BLD AUTO: 5.12 M/UL (ref 4.6–6.2)
SAMPLE: ABNORMAL
SITE: ABNORMAL
SODIUM BLD-SCNC: 143 MMOL/L (ref 136–145)
SODIUM SERPL-SCNC: 138 MMOL/L (ref 136–145)
SP02: 96
WBC # BLD AUTO: 10.85 K/UL (ref 3.9–12.7)

## 2021-05-31 PROCEDURE — 96374 THER/PROPH/DIAG INJ IV PUSH: CPT

## 2021-05-31 PROCEDURE — 63600175 PHARM REV CODE 636 W HCPCS: Performed by: EMERGENCY MEDICINE

## 2021-05-31 PROCEDURE — 80053 COMPREHEN METABOLIC PANEL: CPT | Performed by: EMERGENCY MEDICINE

## 2021-05-31 PROCEDURE — 96375 TX/PRO/DX INJ NEW DRUG ADDON: CPT

## 2021-05-31 PROCEDURE — 25000003 PHARM REV CODE 250: Performed by: EMERGENCY MEDICINE

## 2021-05-31 PROCEDURE — 96361 HYDRATE IV INFUSION ADD-ON: CPT

## 2021-05-31 PROCEDURE — 99284 EMERGENCY DEPT VISIT MOD MDM: CPT | Mod: 25

## 2021-05-31 PROCEDURE — 85025 COMPLETE CBC W/AUTO DIFF WBC: CPT | Performed by: EMERGENCY MEDICINE

## 2021-05-31 RX ORDER — ONDANSETRON 4 MG/1
4 TABLET, ORALLY DISINTEGRATING ORAL EVERY 6 HOURS PRN
Qty: 12 TABLET | Refills: 0 | Status: SHIPPED | OUTPATIENT
Start: 2021-05-31

## 2021-05-31 RX ORDER — ONDANSETRON 2 MG/ML
8 INJECTION INTRAMUSCULAR; INTRAVENOUS
Status: COMPLETED | OUTPATIENT
Start: 2021-05-31 | End: 2021-05-31

## 2021-05-31 RX ORDER — DICYCLOMINE HYDROCHLORIDE 20 MG/1
20 TABLET ORAL 3 TIMES DAILY
Qty: 30 TABLET | Refills: 0 | Status: SHIPPED | OUTPATIENT
Start: 2021-05-31 | End: 2021-06-30

## 2021-05-31 RX ORDER — METOCLOPRAMIDE 10 MG/1
10 TABLET ORAL EVERY 6 HOURS PRN
Qty: 10 TABLET | Refills: 0 | Status: SHIPPED | OUTPATIENT
Start: 2021-05-31 | End: 2021-05-31 | Stop reason: ALTCHOICE

## 2021-05-31 RX ORDER — METOCLOPRAMIDE HYDROCHLORIDE 5 MG/ML
10 INJECTION INTRAMUSCULAR; INTRAVENOUS ONCE AS NEEDED
Status: COMPLETED | OUTPATIENT
Start: 2021-05-31 | End: 2021-05-31

## 2021-05-31 RX ADMIN — SODIUM CHLORIDE 1000 ML: 0.9 INJECTION, SOLUTION INTRAVENOUS at 03:05

## 2021-05-31 RX ADMIN — METOCLOPRAMIDE 10 MG: 5 INJECTION, SOLUTION INTRAMUSCULAR; INTRAVENOUS at 04:05

## 2021-05-31 RX ADMIN — ONDANSETRON 8 MG: 2 INJECTION INTRAMUSCULAR; INTRAVENOUS at 03:05

## 2021-06-03 ENCOUNTER — TELEPHONE (OUTPATIENT)
Dept: PAIN MEDICINE | Facility: CLINIC | Age: 86
End: 2021-06-03

## 2023-12-26 ENCOUNTER — HOSPITAL ENCOUNTER (EMERGENCY)
Facility: OTHER | Age: 88
Discharge: HOME | End: 2023-12-26
Attending: INTERNAL MEDICINE
Payer: MEDICARE

## 2023-12-26 VITALS
RESPIRATION RATE: 18 BRPM | DIASTOLIC BLOOD PRESSURE: 81 MMHG | OXYGEN SATURATION: 98 % | TEMPERATURE: 98 F | HEART RATE: 65 BPM | SYSTOLIC BLOOD PRESSURE: 138 MMHG

## 2023-12-26 DIAGNOSIS — R53.1 WEAKNESS: ICD-10-CM

## 2023-12-26 DIAGNOSIS — R11.10 VOMITING, UNSPECIFIED VOMITING TYPE, UNSPECIFIED WHETHER NAUSEA PRESENT: Primary | ICD-10-CM

## 2023-12-26 LAB
ALBUMIN SERPL BCP-MCNC: 3.6 G/DL (ref 3.5–5.2)
ALP SERPL-CCNC: 93 U/L (ref 55–135)
ALT SERPL W/O P-5'-P-CCNC: 12 U/L (ref 10–44)
ANION GAP SERPL CALC-SCNC: 9 MMOL/L (ref 8–16)
AST SERPL-CCNC: 16 U/L (ref 10–40)
BASOPHILS # BLD AUTO: 0.03 K/UL (ref 0–0.2)
BASOPHILS NFR BLD: 0.4 % (ref 0–1.9)
BILIRUB SERPL-MCNC: 0.3 MG/DL (ref 0.1–1)
BILIRUB UR QL STRIP: NEGATIVE
BUN SERPL-MCNC: 15 MG/DL (ref 10–30)
CALCIUM SERPL-MCNC: 8.8 MG/DL (ref 8.7–10.5)
CHLORIDE SERPL-SCNC: 109 MMOL/L (ref 95–110)
CK SERPL-CCNC: 287 U/L (ref 20–200)
CLARITY UR: CLEAR
CO2 SERPL-SCNC: 24 MMOL/L (ref 23–29)
COLOR UR: YELLOW
CREAT SERPL-MCNC: 1.2 MG/DL (ref 0.5–1.4)
DIFFERENTIAL METHOD: ABNORMAL
EOSINOPHIL # BLD AUTO: 0.1 K/UL (ref 0–0.5)
EOSINOPHIL NFR BLD: 0.9 % (ref 0–8)
ERYTHROCYTE [DISTWIDTH] IN BLOOD BY AUTOMATED COUNT: 14.4 % (ref 11.5–14.5)
EST. GFR  (NO RACE VARIABLE): 56 ML/MIN/1.73 M^2
GLUCOSE SERPL-MCNC: 149 MG/DL (ref 70–110)
GLUCOSE UR QL STRIP: NEGATIVE
HCT VFR BLD AUTO: 36.4 % (ref 40–54)
HGB BLD-MCNC: 11.8 G/DL (ref 14–18)
HGB UR QL STRIP: NEGATIVE
IMM GRANULOCYTES # BLD AUTO: 0.03 K/UL (ref 0–0.04)
IMM GRANULOCYTES NFR BLD AUTO: 0.4 % (ref 0–0.5)
KETONES UR QL STRIP: NEGATIVE
LEUKOCYTE ESTERASE UR QL STRIP: NEGATIVE
LYMPHOCYTES # BLD AUTO: 0.8 K/UL (ref 1–4.8)
LYMPHOCYTES NFR BLD: 10.5 % (ref 18–48)
MCH RBC QN AUTO: 27.6 PG (ref 27–31)
MCHC RBC AUTO-ENTMCNC: 32.4 G/DL (ref 32–36)
MCV RBC AUTO: 85 FL (ref 82–98)
MONOCYTES # BLD AUTO: 0.5 K/UL (ref 0.3–1)
MONOCYTES NFR BLD: 6.6 % (ref 4–15)
NEUTROPHILS # BLD AUTO: 6.1 K/UL (ref 1.8–7.7)
NEUTROPHILS NFR BLD: 81.2 % (ref 38–73)
NITRITE UR QL STRIP: NEGATIVE
NRBC BLD-RTO: 0 /100 WBC
PH UR STRIP: 6 [PH] (ref 5–8)
PLATELET # BLD AUTO: 144 K/UL (ref 150–450)
PMV BLD AUTO: 11.8 FL (ref 9.2–12.9)
POCT GLUCOSE: 116 MG/DL (ref 70–110)
POTASSIUM SERPL-SCNC: 3.4 MMOL/L (ref 3.5–5.1)
PROT SERPL-MCNC: 6.7 G/DL (ref 6–8.4)
PROT UR QL STRIP: NEGATIVE
RBC # BLD AUTO: 4.28 M/UL (ref 4.6–6.2)
SODIUM SERPL-SCNC: 142 MMOL/L (ref 136–145)
SP GR UR STRIP: 1.02 (ref 1–1.03)
TROPONIN I SERPL DL<=0.01 NG/ML-MCNC: 0.01 NG/ML (ref 0–0.03)
TROPONIN I SERPL DL<=0.01 NG/ML-MCNC: <0.006 NG/ML (ref 0–0.03)
TSH SERPL DL<=0.005 MIU/L-ACNC: 2.42 UIU/ML (ref 0.4–4)
URN SPEC COLLECT METH UR: NORMAL
UROBILINOGEN UR STRIP-ACNC: NEGATIVE EU/DL
WBC # BLD AUTO: 7.55 K/UL (ref 3.9–12.7)

## 2023-12-26 PROCEDURE — 99284 EMERGENCY DEPT VISIT MOD MDM: CPT

## 2023-12-26 PROCEDURE — 93010 EKG 12-LEAD: ICD-10-PCS | Mod: ,,, | Performed by: INTERNAL MEDICINE

## 2023-12-26 PROCEDURE — 82550 ASSAY OF CK (CPK): CPT | Performed by: INTERNAL MEDICINE

## 2023-12-26 PROCEDURE — 84443 ASSAY THYROID STIM HORMONE: CPT | Performed by: INTERNAL MEDICINE

## 2023-12-26 PROCEDURE — 81003 URINALYSIS AUTO W/O SCOPE: CPT | Performed by: INTERNAL MEDICINE

## 2023-12-26 PROCEDURE — 85025 COMPLETE CBC W/AUTO DIFF WBC: CPT | Performed by: INTERNAL MEDICINE

## 2023-12-26 PROCEDURE — 84484 ASSAY OF TROPONIN QUANT: CPT | Performed by: INTERNAL MEDICINE

## 2023-12-26 PROCEDURE — 93010 ELECTROCARDIOGRAM REPORT: CPT | Mod: ,,, | Performed by: INTERNAL MEDICINE

## 2023-12-26 PROCEDURE — 80053 COMPREHEN METABOLIC PANEL: CPT | Performed by: INTERNAL MEDICINE

## 2023-12-26 PROCEDURE — 82962 GLUCOSE BLOOD TEST: CPT

## 2023-12-26 PROCEDURE — 93005 ELECTROCARDIOGRAM TRACING: CPT

## 2023-12-26 NOTE — DISCHARGE INSTRUCTIONS
Diagnosis:   1. Vomiting, unspecified vomiting type, unspecified whether nausea present    2. Weakness        Tests you had showed:   Labs Reviewed   CBC W/ AUTO DIFFERENTIAL - Abnormal; Notable for the following components:       Result Value    RBC 4.28 (*)     Hemoglobin 11.8 (*)     Hematocrit 36.4 (*)     Platelets 144 (*)     Lymph # 0.8 (*)     Gran % 81.2 (*)     Lymph % 10.5 (*)     All other components within normal limits   COMPREHENSIVE METABOLIC PANEL - Abnormal; Notable for the following components:    Potassium 3.4 (*)     Glucose 149 (*)     eGFR 56 (*)     All other components within normal limits   CK - Abnormal; Notable for the following components:     (*)     All other components within normal limits   POCT GLUCOSE - Abnormal; Notable for the following components:    POCT Glucose 116 (*)     All other components within normal limits   URINALYSIS, REFLEX TO URINE CULTURE    Narrative:     Specimen Source->Urine   TSH   TROPONIN I   CK   TROPONIN I   POCT GLUCOSE MONITORING CONTINUOUS      No orders to display       Treatments you received were:   Medications - No data to display    Home Care Instructions:  - Medications: Continue taking your home medications as prescribed    Follow-Up Plan:  - Follow-up with: Primary care doctor within 1-2  days  - Additional testing and/or evaluation will be directed by your primary doctor    Return to the Emergency Department for symptoms including but not limited to: worsening symptoms, severe back pain, shortness of breath or chest pain, vomiting with inability to hold down fluids, blood in vomit or poop, fevers greater than 100.4°F, passing out/fainting/unconsciousness, or other concerning symptoms.     No future appointments.

## 2023-12-26 NOTE — ED PROVIDER NOTES
"Encounter date: 2:36 AM 12/26/2023    Source of History   Patient/EMS/family    Chief Complaint   Pt presents with:   Fatigue (Pt arrived via EMS from home, per EMS pt took 2 edibles and now feeling weak. Also ingested Hempseed oil )      History Of Present Illness   Jem Pak is a 89 y.o. male with history of CAD, hypertension, lumbar degenerative disc disease who presents to the ED with chief complaint of nausea vomiting in feelings of weakness after taking THC edibles and taking hempseed oil.  Patient states that he has had chronic back pain without acute worsening.  He was instructed to try THC for his back.  Today he took 2 THC edibles with unknown strength at 9:30 a.m. and 10:30 p.m..  He also notes that he took a 1000 mg of him seed oil at 3:00 p.m. he took these last medications in preparation to go to bed.  He states that he started to have hallucinations seeing animals, terrains and things that I knew weren't there".  Around 10 30 when he was trying to go to sleep he had multiple bouts of nonbloody nonbilious emesis.  Does note 1 bout of loose stool without blood in it.  He denies any chest pain or shortness of breath.  He states that his back pain is chronic and has not acutely worsened.  He denies new bowel or bladder incontinence, lower extremity weakness, saddle anesthesia, falls or trauma.  He denies any head trauma.  He states that his symptoms seem to be resolving on arrival to the ED.    This is the extent to the patients complaints today here in the emergency department.  Past History     Review of patient's allergies indicates:   Allergen Reactions    Ciprofloxacin Nausea And Vomiting     Fever, chills, diarrhea    Benadryl [diphenhydramine hcl] Other (See Comments)     Unable to urinate       No current facility-administered medications on file prior to encounter.     Current Outpatient Medications on File Prior to Encounter   Medication Sig Dispense Refill    amLODIPine (NORVASC) 10 MG " tablet TAKE 1 TABLET BY MOUTH EVERY DAY 90 tablet 3    aspirin (ECOTRIN) 81 MG EC tablet Take 81 mg by mouth once daily.      doxazosin (CARDURA XL) 8 mg 24 hr tablet Take 8 mg by mouth daily with breakfast.      etodolac (LODINE) 400 MG tablet Take 400 mg by mouth 2 (two) times daily.      ferrous sulfate 325 mg (65 mg iron) Tab tablet Take 325 mg by mouth daily with breakfast.      finasteride (PROSCAR) 5 mg tablet Take 5 mg by mouth once daily.      lovastatin (MEVACOR) 40 MG tablet Take 40 mg by mouth every evening.      ondansetron (ZOFRAN-ODT) 4 MG TbDL Take 1 tablet (4 mg total) by mouth every 6 (six) hours as needed (nausea or vomiting). 12 tablet 0    ondansetron (ZOFRAN-ODT) 4 MG TbDL Take 1 tablet (4 mg total) by mouth every 6 (six) hours as needed (nausea or vomiting). 12 tablet 0    predniSONE (DELTASONE) 5 MG tablet TAKE 1 TABLET BY MOUTH EVERY DAY 90 tablet 4    tamsulosin (FLOMAX) 0.4 mg Cap Take 1 capsule (0.4 mg total) by mouth once daily. 90 capsule 3       As per HPI and below:  Past Medical History:   Diagnosis Date    Anticoagulant long-term use     Arthritis     BPH (benign prostatic hyperplasia)     Chronic back pain     with injections Dr. Ephraim Hightower (Pain Management)    Coronary artery disease     Hypertension     PVD (peripheral vascular disease)     SOB (shortness of breath)      Past Surgical History:   Procedure Laterality Date    COLONOSCOPY N/A 9/5/2018    Procedure: COLONOSCOPY;  Surgeon: Deepti Ferrell MD;  Location: [facility];  Service: Endoscopy;  Laterality: N/A;    CORONARY STENT PLACEMENT      EYE SURGERY      bilateral cataracts    KIDNEY STONE SURGERY      PROSTATECTOMY  7/2013    TURP    VASCULAR SURGERY         Social History     Socioeconomic History    Marital status: Single   Tobacco Use    Smoking status: Former    Smokeless tobacco: Former     Quit date: 4/20/2010    Tobacco comments:     quit    Substance and Sexual Activity    Alcohol use: No    Drug use: No        No family history on file.    Physical Exam     Vitals:    12/26/23 0230 12/26/23 0302 12/26/23 0542   BP: (!) 147/89  138/81   Pulse: (!) 58  65   Resp: 16  18   Temp:  97.6 °F (36.4 °C) 97.8 °F (36.6 °C)   TempSrc:  Oral Oral   SpO2: 100%  98%     Physical Exam:   Nursing note and vitals reviewed.  Appearance:  Elderly, well appearing nontoxic male in no acute respiratory distress.  Making purposeful movements.  Speaking in full sentences.  Skin: No obvious rashes seen.  Good turgor.  No abrasions.  No ecchymoses.  Eyes: No conjunctival injection. EOMI, PERRL.  Head: NC/AT  Chest: CTAB. No increased work of breathing, bilateral chest rise.  Cardiovascular: Regular rate and rhythm.  Normal equal bilateral radial pulses.  Abdomen: Soft.  Not distended.  Nontender.  No guarding.  No rebound. No Masses  Musculoskeletal: No obvious deformities.   Neck supple, full range of motion, no obvious deformity.   No tenderness to palpation of cervical through lumbar spine.  No step-offs or deformities. Good range of motion all joints.  Neurologic: Moves all extremities and carries on conversation. CN- II: PERRL; III/IV/VI: EOMI w/out evidence of nystagmus; V: no deficits appreciated to light touch bilateral face; VII: no facial weakness, no facial asymmetry. Eyebrow raise symmetric. Smile symmetric; IX/X: palate midline, and raises symmetrically; XI: shoulder shrug 5/5 bilaterally; XII: tongue is midline w/out asymmetry. Strength 4/5 to bilateral upper and lower extremities, sensation intact to light touch. Gait normal.  Mental Status:  Alert and oriented x 3.  Appropriate, conversant.      Results and Medications    Procedures    Labs Reviewed   CBC W/ AUTO DIFFERENTIAL - Abnormal; Notable for the following components:       Result Value    RBC 4.28 (*)     Hemoglobin 11.8 (*)     Hematocrit 36.4 (*)     Platelets 144 (*)     Lymph # 0.8 (*)     Gran % 81.2 (*)     Lymph % 10.5 (*)     All other components within normal  limits   COMPREHENSIVE METABOLIC PANEL - Abnormal; Notable for the following components:    Potassium 3.4 (*)     Glucose 149 (*)     eGFR 56 (*)     All other components within normal limits   CK - Abnormal; Notable for the following components:     (*)     All other components within normal limits   POCT GLUCOSE - Abnormal; Notable for the following components:    POCT Glucose 116 (*)     All other components within normal limits   URINALYSIS, REFLEX TO URINE CULTURE    Narrative:     Specimen Source->Urine   TSH   TROPONIN I   CK   TROPONIN I   POCT GLUCOSE MONITORING CONTINUOUS       Imaging Results    None             Medications - No data to display    MDM, Impression and Plan   Independently Interpreted Test(s):   EKG:  I independently reviewed and interpreted the EKG and my findings are as follows:   Detailed here or in ED course.     Clinical Tests:   Lab Tests: Ordered and Reviewed  Radiological Study: Ordered and Reviewed  Medical Tests: Ordered and Reviewed    Differential diagnosis:  -electrolyte abnormalities  -THC toxidrome   -rhabdomyolysis   -unlikely ACS without chest pain or shortness of breath      Initial Assessment & ED Management:    Urgent evaluation of 89 y.o. male with History as above who presents the ED with chief complaint of nausea, general feelings of unwellness and weakness after a THC edible ingestion.  He did note hallucinations, have multiple bouts of nonbloody nonbilious emesis after his ingestion.  On arrival to the ED he is noted to be afebrile hemodynamically stable in no acute respiratory distress.  His EKG shows no acute signs of ischemia.  His troponin x2 remained flat.  Urinalysis without signs of UTI.  CPK within normal limits making rhabdomyolysis unlikely.  Creatinine near baseline with no gross abnormalities on CMP.  Urinalysis without signs of UTI.  CBC grossly unremarkable.  He had no pain on my evaluation and declined analgesics.  He has nausea had resolved  prior to coming to the ED and he p.o. challenged successfully.  His gait is stable.  Had shared decision-making with patient about staying in the ED.  At this point in time he would prefer to go home as he was has no symptoms and states he will follow up with his PCP and cardiologist in the next 1-2 days.  Care plan addressed with patient and all those present. All questions answered.  Strict return precautions discussed.  Patient was instructed on the correct follow-up time and route.  They voiced verbal understanding and agreement  with the plan and were deemed stable for discharge.       Medical Decision Making  Amount and/or Complexity of Data Reviewed  Labs: ordered.           Please see ED course for discussion of workup and dispo if not listed above.          Final diagnoses:  [R53.1] Weakness  [R11.10] Vomiting, unspecified vomiting type, unspecified whether nausea present (Primary)        ED Disposition Condition    Discharge Stable          ED Prescriptions    None       Follow-up Information       Follow up With Specialties Details Why Contact Info    Sandeep Richey III, MD Internal Medicine In 2 days If symptoms worsen and for lab check [address]  [facility] [id number]  [phone]              ED Course as of 12/26/23 0749   Tue Dec 26, 2023   0308 EKG with poor wave progression in noted artifact with ventricular rate of 59 beats per minute no acute signs of ischemia.  His last EKG was performed in 2017 and there is obviously been a change [HM]   0341 On repeat assessment patient notes no ongoing symptoms. [HM]   0610 Gait stable per nursing    [HM]      ED Course User Index  [HM] Gael Ely MD Heyward Mack, MD Mack, Heyward B, MD  12/26/23 2941

## (undated) DEVICE — BANDAID STRIP PLASTIC 3/4X3

## (undated) DEVICE — BANDAGE ADHESIVE